# Patient Record
Sex: MALE | Race: WHITE
[De-identification: names, ages, dates, MRNs, and addresses within clinical notes are randomized per-mention and may not be internally consistent; named-entity substitution may affect disease eponyms.]

---

## 2017-06-03 NOTE — EDPHY
H & P


HPI/ROS: 





CHIEF COMPLAINT:  Abdominal pain





HISTORY OF PRESENT ILLNESS:  Patient is a 66-year-old male with multiple 

medical problems who presents to the emergency department with right mid 

abdominal pain since this morning. Patient has previously had bladder cancer 

with resection.  He has urostomy in place.  He has noticed increased mucosal 

distension at the urostomy site.  He also has pain at that location.  His pain 

is moderate to severe.  He states he also has worsening shortness of breath 

since this morning. No cough.  No fevers or chills. Patient denies chest pain.





REVIEW OF SYSTEMS:  


My complete review of systems is negative except as mentioned in the HPI.


Past Medical/Surgical History: 





Includes bladder cancer, status post resection, urinary tract infection, 

diverticulosis, hypertension, hepatic steatosis, hiatal hernia, atrial 

fibrillation, hydronephrosis





Past surgical history:  Includes nephrostomy, cystoprostatectomy with ileal 

conduit





Social history:  The patient is a [facility] resident


Smoking Status: Current some day smoker


Physical Exam: 





Vitals noted


GENERAL:  mild acute distress, alert.


HEENT:  Eyes normal to inspection, normal pharynx, no signs of dehydration.


NECK:  No thyromegaly, no lymphadenopathy, supple.


RESPIRATORY:  mild increased work of breathing.  No rales, rhonchi or wheezing.


CVS:  Regular rate and rhythm, no rubs, murmurs, or gallops.


ABDOMEN:  Soft, nondistended, no organomegaly.  Patient has of the right 

urostomy bag in place.  This has yellow discharge. There is pink mucosa noted.  

Mild tenderness to palpation surrounding the bag.  No visible cellulitis


BACK:  Normal to inspection, no CVA tenderness.


SKIN:  Normal color, no rash, warm, dry.  No pallor.


EXTREMITIES:  No pedal edema, no calf tenderness, no Homans sign or cords, no 

joint swelling.


NEURO/PSYCH:  Alert and oriented x3, normal mood and affect, normal motor 

sensory exam.  No obvious cranial nerve deficit.


Constitutional: 


 Initial Vital Signs











Temperature (C)  36.6 C   06/03/17 18:13


 


Heart Rate  80   06/03/17 18:13


 


Respiratory Rate  16   06/03/17 18:13


 


Blood Pressure  117/98 H  06/03/17 18:13


 


O2 Sat (%)  94   06/03/17 18:13








 











O2 Delivery Mode               Nasal Cannula


 


O2 (L/minute)                  2














Allergies/Adverse Reactions: 


 





Hemant's Formula 44D Allergy (Uncoded 12/18/16 17:52)


 "throat swells up"








Home Medications: 














 Medication  Instructions  Recorded


 


NK [No Known Home Meds]  06/03/17














Medical Decision Making





- Diagnostics


EKG Interpretation: 





Sinus rhythm at 81. Normal axis.  Normal intervals.  No ST or T-wave 

abnormality.


Imaging Results: 


 Imaging Impressions





Abdomen CT  06/03/17 18:21


Impression:


1. Right hydronephrosis of a patient status post cystectomy and prostatectomy. 

A left ureteral stent is in place.


2. Herniation of loops of small bowel adjacent to the right lower quadrant 

ostomy.


3. Rather extensive diverticulosis without diverticulitis.


4. See above report for additional findings.


 


Results called and discussed with MAYA MICHAEL M.D. on 6/3/2017 at 20:44








Chest X-Ray  06/03/17 18:21


Impression:


1. Borderline cardiac enlargement without pulmonary edema.


2. Basilar opacities probably a combination of atelectasis and scarring.











ED Course/Re-evaluation: 





I met EMS on arrival in took report from the paramedic.  In the emergency 

department I discussed possible etiologies with the patient.  Laboratory 

studies and CT imaging were ordered.  Patient was given fentanyl 50 mcg IV and 

Zofran 4 mg IV.





IV the patient's laboratory studies.  White count was normal. He was mildly 

anemic with hematocrit of 36. His BUN was 28 creatinine was 1.1.





CT of the abdomen pelvis:  Please refer to the dictated report.  The patient 

has a small bowel herniation through his ileostomy site.  The urostomy appears 

to be intact. Please refer the dictated report by Dr. Terrance Bro.





Chest x-ray:  Bilateral basilar atelectasis and scarring.  No focal 

infiltrates.  Please refer the dictated report by Dr. Bro.





I discussed the results with the patient.  I answered all his questions.  

Patient was also noted to have significant white cells in his urine.  I am 

aware he has urostomy but he will be treated with Rocephin while he is being 

evaluated for his hernia.





General surgery, Dr. Kaufman was paged.  Hospital service was paged.





910:  I discussed the case with Dr. Kaufman.  He will evaluate the patient for 

the CT findings and herniation.


Differential Diagnosis: 





My differential includes but is not limited to urostomy malfunction, small-

bowel obstruction, perforation, hernia, abscess, pneumonia, pneumothorax, ACS, 

acute MI





- Data Points


Laboratory Results: 


 Laboratory Results





 06/03/17 18:15 





 06/03/17 18:15 





 











  06/03/17 06/03/17 06/03/17





  19:30 18:15 18:15


 


WBC      





    


 


RBC      





    


 


Hgb      





    


 


Hct      





    


 


MCV      





    


 


MCH      





    


 


MCHC      





    


 


RDW      





    


 


Plt Count      





    


 


MPV      





    


 


Neut % (Auto)      





    


 


Lymph % (Auto)      





    


 


Mono % (Auto)      





    


 


Eos % (Auto)      





    


 


Baso % (Auto)      





    


 


Nucleat RBC Rel Count      





    


 


Absolute Neuts (auto)      





    


 


Absolute Lymphs (auto)      





    


 


Absolute Monos (auto)      





    


 


Absolute Eos (auto)      





    


 


Absolute Basos (auto)      





    


 


Absolute Nucleated RBC      





    


 


Immature Gran %      





    


 


Immature Gran #      





    


 


PT      12.7 SEC SEC





     (12.0-15.0) 


 


INR      0.96 





     (0.83-1.16) 


 


APTT      27.6 SEC SEC





     (23.0-38.0) 


 


Sodium    142 mEq/L mEq/L  





    (134-144)  


 


Potassium    4.7 mEq/L mEq/L  





    (3.5-5.2)  


 


Chloride    117 mEq/L H mEq/L  





    ()  


 


Carbon Dioxide    24 mEq/l mEq/l  





    (22-31)  


 


Anion Gap    1 mEq/L L mEq/L  





    (8-16)  


 


BUN    28 mg/dL H mg/dL  





    (7-23)  


 


Creatinine    1.1 mg/dL mg/dL  





    (0.7-1.3)  


 


Estimated GFR    > 60   





    


 


Glucose    87 mg/dL mg/dL  





    ()  


 


Calcium    9.2 mg/dL mg/dL  





    (8.5-10.4)  


 


Troponin I    < 0.012 ng/mL ng/mL  





    (0-0.034)  


 


NT-Pro-B Natriuret Pep    95 pg/mL pg/mL  





    (0-125)  


 


Urine Color  YELLOW     





    


 


Urine Appearance  MODERATELY TURBID     





    


 


Urine pH  6.0     





   (5.0-7.5)   


 


Ur Specific Gravity  1.011     





   (1.002-1.030)   


 


Urine Protein  1+  H     





   (NEGATIVE)   


 


Urine Ketones  NEGATIVE     





   (NEGATIVE)   


 


Urine Blood  1+  H     





   (NEGATIVE)   


 


Urine Nitrate  POSITIVE  H     





   (NEGATIVE)   


 


Urine Bilirubin  NEGATIVE     





   (NEGATIVE)   


 


Urine Urobilinogen  NEGATIVE EU EU    





   (0.2-1.0)   


 


Ur Leukocyte Esterase  3+  H     





   (NEGATIVE)   


 


Urine RBC  15-25 /hpf H /hpf    





   (0-3)   


 


Urine WBC   /hpf H /hpf    





   (0-3)   


 


Ur Epithelial Cells  NONE SEEN /lpf /lpf    





   (NONE-1+)   


 


Urine Bacteria  TRACE /hpf H /hpf    





   (NONE SEEN)   


 


Urine Glucose  NEGATIVE     





   (NEGATIVE)   














  06/03/17





  18:15


 


WBC  5.93 10^3/uL 10^3/uL





   (3.80-9.50) 


 


RBC  3.95 10^6/uL L 10^6/uL





   (4.40-6.38) 


 


Hgb  11.3 g/dL L g/dL





   (13.7-17.5) 


 


Hct  36.8 % L %





   (40.0-51.0) 


 


MCV  93.2 fL fL





   (81.5-99.8) 


 


MCH  28.6 pg pg





   (27.9-34.1) 


 


MCHC  30.7 g/dL L g/dL





   (32.4-36.7) 


 


RDW  16.1 % H %





   (11.5-15.2) 


 


Plt Count  248 10^3/uL 10^3/uL





   (150-400) 


 


MPV  9.3 fL fL





   (8.7-11.7) 


 


Neut % (Auto)  62.7 % %





   (39.3-74.2) 


 


Lymph % (Auto)  22.6 % %





   (15.0-45.0) 


 


Mono % (Auto)  10.6 % %





   (4.5-13.0) 


 


Eos % (Auto)  3.0 % %





   (0.6-7.6) 


 


Baso % (Auto)  0.8 % %





   (0.3-1.7) 


 


Nucleat RBC Rel Count  0.0 % %





   (0.0-0.2) 


 


Absolute Neuts (auto)  3.71 10^3/uL 10^3/uL





   (1.70-6.50) 


 


Absolute Lymphs (auto)  1.34 10^3/uL 10^3/uL





   (1.00-3.00) 


 


Absolute Monos (auto)  0.63 10^3/uL 10^3/uL





   (0.30-0.80) 


 


Absolute Eos (auto)  0.18 10^3/uL 10^3/uL





   (0.03-0.40) 


 


Absolute Basos (auto)  0.05 10^3/uL 10^3/uL





   (0.02-0.10) 


 


Absolute Nucleated RBC  0.00 10^3/uL 10^3/uL





   (0-0.01) 


 


Immature Gran %  0.3 % %





   (0.0-1.1) 


 


Immature Gran #  0.02 10^3/uL 10^3/uL





   (0.00-0.10) 


 


PT  





  


 


INR  





  


 


APTT  





  


 


Sodium  





  


 


Potassium  





  


 


Chloride  





  


 


Carbon Dioxide  





  


 


Anion Gap  





  


 


BUN  





  


 


Creatinine  





  


 


Estimated GFR  





  


 


Glucose  





  


 


Calcium  





  


 


Troponin I  





  


 


NT-Pro-B Natriuret Pep  





  


 


Urine Color  





  


 


Urine Appearance  





  


 


Urine pH  





  


 


Ur Specific Gravity  





  


 


Urine Protein  





  


 


Urine Ketones  





  


 


Urine Blood  





  


 


Urine Nitrate  





  


 


Urine Bilirubin  





  


 


Urine Urobilinogen  





  


 


Ur Leukocyte Esterase  





  


 


Urine RBC  





  


 


Urine WBC  





  


 


Ur Epithelial Cells  





  


 


Urine Bacteria  





  


 


Urine Glucose  





  














Departure





- Departure


Clinical Impression: 


 Shortness of breath





Abdominal pain


Qualifiers:


 Abdominal location: generalized Qualified Code(s): R10.84 - Generalized 

abdominal pain





Urinary tract infection


Qualifiers:


 Urinary tract infection type: site unspecified Hematuria presence: with 

hematuria Qualified Code(s): N39.0 - Urinary tract infection, site not specified

; R31.9 - Hematuria, unspecified





Condition: Good


Referrals: 


Patient,NotPresent [Unknown] - As per Instructions

## 2017-06-03 NOTE — CPEKG
Heart Rate: 81

RR Interval: 741

P-R Interval: 176

QRSD Interval: 102

QT Interval: 388

QTC Interval: 451

P Axis: 14

QRS Axis: -6

T Wave Axis: 3

EKG Severity - NORMAL ECG -

EKG Impression: SINUS RHYTHM

Electronically Signed By: Letty Grajeda 03-Jun-2017 21:29:03

## 2017-06-03 NOTE — GHP
[f rep st]



                                                       PREOP HISTORY AND PHYSICAL





DATE OF ADMISSION:  06/03/2017



HISTORY OF PRESENT ILLNESS:  A 66-year-old male, who presents with crampy abdominal pain for 2 days.
  He is shown to have a parastomal hernia which is incarcerated and tender.  He has a urine stoma af
ter having a radical cystectomy and appears to have a parastomal incarcerated hernia.  He is admitte
d at this time for surgery.  Risks and options have been fully discussed, and he wishes to proceed. 
 His labs were okay.  Urine shows 100 white cells.



PAST MEDICAL HISTORY:  Includes bladder cancer with a radical resection.  He has a chronic urinary t
ract infection.  History of hypertension, hiatal hernia, atrial fibrillation, and hydronephrosis.  ANTONIO marie has had nephrostomy tubes, cystoprostatectomy with ileal conduit.



REVIEW OF SYSTEMS:  Reveals he is a regular smoker.  Denies any major cardiopulmonary symptoms or an
y other major medical problems on a full complete review of systems.



ALLERGIES:  Vicks formula.



HOME MEDICATIONS:  None.



PHYSICAL EXAMINATION:  GENERAL:  Reveals him to be an alert, reasonably comfortable male, afebrile. 
 HEAD/NECK:  Reveals no icterus or oral lesions.  No adenopathy.  No thyromegaly.  CHEST:  Clear to 
auscultation and percussion.  CARDIAC:  Reveals a regular rhythm with no murmurs.  ABDOMEN:  Soft, s
lightly protuberant.  He has a urinary stoma in the right lower quadrant with adjacent tenderness an
d fullness, consistent with an incarcerated peristomal hernia.  He does have bowel sounds.  EXTREMIT
IES:  Benign with full pulses.  No significant edema.  NEUROLOGIC:  Physiologic.  SKIN:  No obvious 
lesions.



IMPRESSION:  Incarcerated peristomal hernia.



PLAN:  Urgent surgery.  Risks and options have been fully discussed, and he wishes to proceed.





Job #:  [id number]

## 2017-06-04 NOTE — CPEKG
Heart Rate: 86

RR Interval: 698

P-R Interval: 172

QRSD Interval: 102

QT Interval: 376

QTC Interval: 450

P Axis: 14

QRS Axis: -8

T Wave Axis: 0

EKG Severity - NORMAL ECG -

EKG Impression: SINUS RHYTHM

Electronically Signed By: Andrae White 04-Jun-2017 11:05:41

## 2017-06-04 NOTE — POSTOPPROG
Post Op Note


Date of Operation: 06/04/17


Surgeon: Dilip Kaufman


Anesthesiologist: ALEJANDRO RAPHAEL


Anesthesia: GET(General Endotracheal)


Pre-op Diagnosis: INCARCERATED PARASTOMAL HERNIA 


Post-op Diagnosis: SAME


Indication: PAIN


Procedure: OPEN REPAIR INCARCERATED PARASTOMAL HERNIA


Findings: VIABLE BOWELL/ 5 CM DEFECT


Inf/Abcess present in the surg proc area at time of surgery?: No


Depth: Organ Space


EBL: Minimal


Complications: 





0


Specimen(s): 





NONE

## 2017-06-04 NOTE — SOAPPROG
SOAP Progress Note


Assessment/Plan: 


Assessment:  Hydronephrosis, left   Acute will remove stent . Right side 

hydronephrosis noted and normal creatinine. Consider Nuclear renogram with 

lasix to assess obstruction or an ileal conduit loopogram to assess reflux for 

rt hydro





Plan: as noted





06/04/17 11:14





Subjective: 


doing well, is well informed





Objective: 





 Vital Signs











Temp Pulse Resp BP Pulse Ox


 


 36.6 C   88   16   136/92 H  93 


 


 06/04/17 08:50  06/04/17 08:50  06/04/17 08:50  06/04/17 08:50  06/04/17 08:50








 Laboratory Results





 06/04/17 05:06 





 06/04/17 05:06 





 











 06/03/17 06/04/17 06/05/17





 05:59 05:59 05:59


 


Intake Total  1060 


 


Output Total  680 


 


Balance  380 








 











PT  12.7 SEC (12.0-15.0)   06/03/17  18:15    


 


INR  0.96  (0.83-1.16)   06/03/17  18:15    














Physical Exam





- Physical Exam


General Appearance: alert


Neck: supple


Respiratory: No respiratory distress


Cardiac/Chest: regular rate, rhythm


Abdomen: soft (stoma and stent removed in tact)


Back: No CVA tenderness


Extremities: non-tender, No calf tenderness, No Shawanda's sign


Neuro/Psych: oriented x 3





ICD10 Worksheet


Patient Problems: 


 Problems











Problem Status Onset


 


Abdominal pain Acute  


 


Shortness of breath Acute  


 


Urinary tract infection Acute  


 


Bladder cancer Acute  


 


Edema Acute  


 


Gross hematuria Acute  


 


Hydronephrosis, left Acute

## 2017-06-04 NOTE — GHP
[f 
rep st]



                                                            HISTORY AND PHYSICAL





DATE OF ADMISSION:  06/03/2017



CHIEF COMPLAINT:  Abdominal pain.



HISTORY OF PRESENT ILLNESS:  The patient is a 66-year-old male with a history 
of bladder cancer, status post resection with an ileal conduit, who presents to 
the emergency department with right upper quadrant and right flank pain.  His 
symptoms started earlier this morning.  He notes increasing distention of his 
abdomen.  No nausea or vomiting.  He denies fevers or chills.  He has had 
normal urine output through his ileal conduit, though some debris has been 
noted.  He has no chest pain.  He does feel a bit short of breath as this 
increases his abdominal discomfort.  No changes in his bowel function. 



In the emergency department, patient underwent abdominal CT scan which showed 
right-sided hydronephrosis as well as herniation of loops of small bowel into 
the right lower quadrant ostomy.  Surgical consult is obtained and he is 
admitted to the hospital for further management.



PAST MEDICAL HISTORY:  

1.  Hypertension.

2.  Atrial fibrillation.

3.  History of bladder cancer.

4.  Diverticulosis.



SURGICAL HISTORY:  

1.  Cystectomy and prostatectomy with bilateral lymph node dissection and ileal 
conduit in September 2016, by Dr. Jewel Martines.

2.  Inguinal hernia repair.

3.  TURBT.



MEDICATIONS:  Please see [facility] for complete updated outpatient medication 
list.



ALLERGIES:  No known drug allergies.



FAMILY HISTORY:  Reviewed and not pertinent.



SOCIAL HISTORY:  The patient lives at [facility].  He is a former smoker.  
He denies alcohol use.  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 
has not used any drugs for at least 2 years.



REVIEW OF SYSTEMS:  A 10-point Review of Systems was performed and was negative 
except as per HPI.



OBJECTIVE:  VITAL SIGNS:  Temperature is 36.6, blood pressure 117/98, heart 
rate 80, respiratory rate 16, he is 94% on room air.  GENERAL:  The patient is 
awake, alert, oriented, in no acute distress.  HEENT:  Head is atraumatic, 
normocephalic.  Pupils equal, round, and reactive to light.  Extraocular 
motions are intact.  Oropharynx is clear.  Mucous membranes are moist.  He has 
poor dentition.  NECK:  Supple.  There is no JVD.  HEART:  Regular rate and 
rhythm without murmur.  LUNGS:  Reveal decreased breath sounds with mild 
atelectatic crackles at the bases.  Otherwise, clear to auscultation.  ABDOMEN:
  Soft, mildly distended.  His ileal conduit reveals clear urine in the bag.  
There is periostomy distention with tenderness to palpation.  There is no 
rebound, rigidity, guarding, or peritoneal signs.  Normoactive bowel tones are 
detected.  EXTREMITIES: Without cyanosis, clubbing, or edema.  NEUROLOGIC:  
Grossly nonfocal.



LABORATORY DATA:  CBC reveals normal white blood cell count, hemoglobin of 11.3
, platelets are normal.  INR is 0.96.  Basic metabolic panel is remarkable for 
chloride of 117, BUN 28, creatinine 1.1.  LFTs are pending.  Troponin is 
negative.  Urinalysis shows 1+ blood, positive nitrites,  white cells.  
This was drawn from his ostomy bag. 



Abdomen and pelvis CT, Elli 3, 2017, shows moderate right-sided hydronephrosis, 
status post cystectomy and prostatectomy.  A left ureteral stent is in place.  
There is herniation of loops of small bowel adjacent to the right lower 
quadrant ostomy along with extensive diverticulosis without evidence of 
diverticulitis. 



Chest x-ray, personally reviewed and interpreted, there is borderline 
cardiomegaly, no kennedy pulmonary edema or obvious infiltrates.  Bibasilar 
opacities likely represent atelectasis.  Decreased lung volumes.



ASSESSMENT AND PLAN:  The patient is a 66-year-old male with a history of 
hypertension, atrial fibrillation, and prior bladder cancer, status post 
resection with ileal conduit, who presents to the emergency department with 
abdominal pain.  He is admitted to the hospital for further evaluation.



1.  Right lower quadrant abdominal hernia associated with an ostomy.  There is 
no evidence of incarceration by CT scan and he is afebrile.  This likely needs 
to be repaired.  The case was reviewed with Dr. Michael Kaufman, general surgery, 
who will consult.  He will be admitted for pain control and likely surgical 
intervention.  Will make him n.p.o. at midnight.  We will closely monitor his 
vital signs, and discuss with Surgery should he develop any fevers or acute 
status changes.



2.  Right-sided hydronephrosis.  This is new.  His right ureter drains into the 
ileal conduit.  Thus, the hernia may be contributing to an obstructive process.
  As above, he is afebrile with normal wbc's.  He does have an abnormal 
urinalysis, though this was drawn from his ostomy bag, so I would expect this 
to look abnormal.  He received a dose of IV ceftriaxone in the ED.  Will defer 
further antibiotics for now, though should he have fevers or change in clinical 
status, would resume antibiotics.  Urology has been consulted and will see the 
patient in the morning.  His hydro may resolve once he has his hernia repaired.



3.  Hypertension.  The patient is normotensive on arrival.  His medication 
reconciliation is pending.



4.  History of atrial fibrillation.  I have ordered an EKG to confirm his 
rhythm which sounded regular by auscultation on exam.  As above, I am awaiting 
medication reconciliation, though it does not appear he is on any rate control 
agents or anticoagulation, which we can revisit with him postoperatively.



5.  Deep venous thrombosis prophylaxis.  We will place SCDs for now.  Will 
defer Lovenox in the event he goes to the operating room tomorrow.  We can 
start this at an appropriate time postoperatively.



6.  Code status.  Patient is full code.



7.  Disposition.  Patient is admitted as inpatient status, as I expect he will 
likely require greater than 48 hours hospitalization for ongoing management of 
his abdominal hernia and hydronephrosis.





Job #:  [id number]

MTDD

## 2017-06-04 NOTE — SOAPPROG
SOAP Progress Note


Assessment/Plan: 


Assessment:


wound ok/ uo ok/ afebrile/ abd soft/ vs stable























Plan:advance diet





06/04/17 10:22





Objective: 





 Vital Signs











Temp Pulse Resp BP Pulse Ox


 


 36.6 C   88   16   136/92 H  93 


 


 06/04/17 08:50  06/04/17 08:50  06/04/17 08:50  06/04/17 08:50  06/04/17 08:50








 Laboratory Results





 06/04/17 05:06 





 06/04/17 05:06 





 











 06/03/17 06/04/17 06/05/17





 05:59 05:59 05:59


 


Intake Total  1060 


 


Output Total  680 


 


Balance  380 








 











PT  12.7 SEC (12.0-15.0)   06/03/17  18:15    


 


INR  0.96  (0.83-1.16)   06/03/17  18:15    














ICD10 Worksheet


Patient Problems: 


 Problems











Problem Status Onset


 


Abdominal pain Acute  


 


Shortness of breath Acute  


 


Urinary tract infection Acute  


 


Bladder cancer Acute  


 


Edema Acute  


 


Gross hematuria Acute  


 


Hydronephrosis, left Acute

## 2017-06-04 NOTE — HOSPPROG
Hospitalist Progress Note


Assessment/Plan: 





66M resident at [facility], [facility] bladder CA s/p resection and ileal conduit, 

htn, PAF, admitted with RUQ pain and R flank pain starting 6/3, day of admit. 

Noted increasing abdominal distention with normal UOP through ileal conduit. CT 

abdomen showed R sided hydronephrosis as well as herniation of loops of small 

bowel int RLQ ostomy. Pt new to me. Chart reviewed. ECG personally interpreted 

shows SR.





#. RLQ incarcerated parastomal hernia: POD #1 for open repair with Dr. Kaufman





#. R-sided hydronephrosis: may be related to hernia 


   urology has been consulted





#. htn: not on any outpatient meds for this per med rec


   appears acceptable now/ will monitor





#. PAF: currently in SR by 12 lead ECG


   [id number] appears to be 2


   will start ASA when deemed safe from post-op perspective





#. DVT ppx: in SCDs


   will need SC Enox when deemed appropriate by gen surg





#. Los: unclear at this point


   Await urology consult


   Advance diet as per gen surg


Subjective: Reports abdominal pain present. Getting kidney U/S presently.


Objective: 


 Vital Signs











Temp Pulse Resp BP Pulse Ox


 


 97.9 F   88   17   144/86 H  93 


 


 06/04/17 06:30  06/04/17 06:30  06/04/17 06:30  06/04/17 06:30  06/04/17 06:30








 Laboratory Results





 06/04/17 05:06 





 06/04/17 05:06 





 











 06/03/17 06/04/17 06/05/17





 05:59 05:59 05:59


 


Intake Total  1060 


 


Output Total  680 


 


Balance  380 








 











PT  12.7 SEC (12.0-15.0)   06/03/17  18:15    


 


INR  0.96  (0.83-1.16)   06/03/17  18:15    














- Physical Exam


Constitutional: no apparent distress, appears nourished


Ears, Nose, Mouth, Throat: poor dentition


Cardiovascular: regular rate and rhythym, no murmur, rub, or gallop


Respiratory: no respiratory distress


Gastrointestinal: distension, other (+BS )


Genitourinary: other (ostomy bag with urine that is cloudy but without blood)


Skin: warm, normal color


Neurologic: AAOx3


Psychiatric: interacting appropriately





ICD10 Worksheet


Patient Problems: 


 Problems











Problem Status Onset


 


Edema Acute  


 


Bladder cancer Acute  


 


Gross hematuria Acute  


 


Hydronephrosis, left Acute  


 


Abdominal pain Acute  


 


Shortness of breath Acute  


 


Urinary tract infection Acute

## 2017-06-05 NOTE — HOSPPROG
Hospitalist Progress Note


Assessment/Plan: 





66M resident at [facility], [facility] bladder CA s/p resection and ileal conduit, 

htn, PAF, admitted with RUQ pain and R flank pain starting 6/3, day of admit. 

Noted increasing abdominal distention with normal UOP through ileal conduit. CT 

abdomen showed R sided hydronephrosis as well as herniation of loops of small 

bowel int RLQ ostomy. Pt new to me. Chart reviewed. ECG personally interpreted 

shows SR.





#. RLQ incarcerated parastomal hernia: POD #2 for open repair with Dr. Kaufman





#. R-sided hydronephrosis: may be related to hernia 


   urology consult was reviewed and appreciated


   await mag 3 renal scan





#. elevated blood pressure without history of htn (resolved)


   appears acceptable now/ will monitor





#. PAF: currently in SR by 12 lead ECG


   [id number] appears to be 2


   Start ASA 





#. DVT ppx: in SCDs


   -Dr Kaufman is agreeable to start LMWH as well as ASA





#. Los: unclear at this point


   Advance diet as per gen surg





Subjective: contineus to have severe pain over incision site.  no other acute 

complaints


Objective: 


 Vital Signs











Temp Pulse Resp BP Pulse Ox


 


 36.4 C   70   18   114/68   95 


 


 06/05/17 07:35  06/05/17 07:35  06/05/17 07:35  06/05/17 07:35  06/05/17 07:35








 Laboratory Results





 06/04/17 05:06 





 06/04/17 05:06 





 











 06/04/17 06/05/17 06/06/17





 05:59 05:59 05:59


 


Intake Total 1060 1800 750


 


Output Total 680 1000 1020


 


Balance 380 800 -270








 











PT  12.7 SEC (12.0-15.0)   06/03/17  18:15    


 


INR  0.96  (0.83-1.16)   06/03/17  18:15    














- Physical Exam


Constitutional: no apparent distress, appears nourished, not in pain


Cardiovascular: regular rate and rhythym, no murmur, rub, or gallop


Respiratory: no respiratory distress, no rales or rhonchi, clear to auscultation





ICD10 Worksheet


Patient Problems: 


 Problems











Problem Status Onset


 


Edema Acute  


 


Bladder cancer Acute  


 


Gross hematuria Acute  


 


Hydronephrosis, left Acute  


 


Abdominal pain Acute  


 


Shortness of breath Acute  


 


Urinary tract infection Acute

## 2017-06-05 NOTE — SOAPPROG
SOAP Progress Note


Assessment/Plan: 


Assessment:


wound ok/ uo ok/ afebrile/ abd soft/ vs stable























Plan:advance diet





06/04/17 10:22





06/05/17 22:33


SEEN EARLIER THIS AM/ WOUND OK/ AFEBRILE/ CO BLOATING/ ABD SOFT, MILDLY 

DISTENDED WITH BS/ STOMA PINK


PROBABLE CONSTIPATION/  CHECK 2-WAY/ CATHARSIS


Objective: 





 Vital Signs











Temp Pulse Resp BP Pulse Ox


 


 37.1 C   81   18   128/95 H  95 


 


 06/05/17 20:00  06/05/17 20:00  06/05/17 20:00  06/05/17 20:00  06/05/17 20:00








 Laboratory Results





 06/04/17 05:06 





 06/04/17 05:06 





 











 06/04/17 06/05/17 06/06/17





 05:59 05:59 05:59


 


Intake Total 1060 1800 1500


 


Output Total 680 1000 2420


 


Balance 380 800 -920








 











PT  12.7 SEC (12.0-15.0)   06/03/17  18:15    


 


INR  0.96  (0.83-1.16)   06/03/17  18:15    














ICD10 Worksheet


Patient Problems: 


 Problems











Problem Status Onset


 


Abdominal pain Acute  


 


Shortness of breath Acute  


 


Urinary tract infection Acute  


 


Bladder cancer Acute  


 


Edema Acute  


 


Gross hematuria Acute  


 


Hydronephrosis, left Acute

## 2017-06-06 NOTE — HOSPPROG
Hospitalist Progress Note


Assessment/Plan: 





#Abd pain: post-op. Cont PRN opioids





#Incarcerated ileal conduit parastomal hernia


-s/p repair. Cont OOB





#MRSA/Aerococcus in urine: suspect colonization. Afebrile without leukocytosis. 

Repeat UA





#Right-sided hydronephritis: discuss Mag-3 scan with Urology





#h/o bladder cancer: s/p cystoprostatectomy





#PAF: not on rate-controlling meds. Started ASA 81mg





#Deconditioning: walker at baseline. Cont PT/OT





#DVT ppx: Lovenox





#Disp: cont inpt admission with pain control, PT, repeat UA


Subjective: upset this morning bc staff would not change out ostomy bag


Objective: 


 Vital Signs











Temp Pulse Resp BP Pulse Ox


 


 36.8 C   78   16   98/68 L  96 


 


 06/06/17 09:37  06/06/17 09:37  06/06/17 09:37  06/06/17 09:37  06/06/17 09:37








 Laboratory Results





 06/06/17 05:15 





 06/06/17 05:15 





 











 06/05/17 06/06/17 06/07/17





 05:59 05:59 05:59


 


Intake Total 1800 1980 


 


Output Total 1000 3490 


 


Balance 800 -1510 








 











PT  12.7 SEC (12.0-15.0)   06/03/17  18:15    


 


INR  0.96  (0.83-1.16)   06/03/17  18:15    














- Physical Exam


Constitutional: chronically ill appearing, uncomfortable


Eyes: PERRL


Ears, Nose, Mouth, Throat: moist mucous membranes, hearing normal


Cardiovascular: regular rate and rhythym


Respiratory: no respiratory distress


Gastrointestinal: normoactive bowel sounds, soft, non-tender abdomen


Genitourinary: other (surgical incisiopn C/D/I. Urostomy with clear yellow urine

)


Skin: warm


Musculoskeletal: full muscle strength


Neurologic: AAOx3


Psychiatric: interacting appropriately





ICD10 Worksheet


Patient Problems: 


 Problems











Problem Status Onset


 


Edema Acute  


 


Bladder cancer Acute  


 


Gross hematuria Acute  


 


Hydronephrosis, left Acute  


 


Abdominal pain Acute  


 


Shortness of breath Acute  


 


Urinary tract infection Acute

## 2017-06-06 NOTE — SOAPPROG
SOAP Progress Note


Assessment/Plan: 


Assessment/Plan: 66 Y M s/p repair of incarcerated ileal conduit parastomal 

hernia. 





Continue OOB, cathartics, routine post op care. 





S: c/o incisional pain. +BM this am. Doesn't want to be sitting up in chair 

anymore. Says he hasn't walked much. 


O: 


alert, nad


mmm, no jaundice


no wob


abd softly distended +BS


Wound c/d/i, no erythema. Light clear yellow urine in bag. 





06/06/17 13:44





Objective: 





 Vital Signs











Temp Pulse Resp BP Pulse Ox


 


 36.8 C   78   16   98/68 L  96 


 


 06/06/17 09:37  06/06/17 09:37  06/06/17 09:37  06/06/17 09:37  06/06/17 09:37








 Laboratory Results





 06/06/17 05:15 





 06/06/17 05:15 





 











 06/05/17 06/06/17 06/07/17





 05:59 05:59 05:59


 


Intake Total 1800 1980 


 


Output Total 1000 3490 


 


Balance 800 -1510 








 











PT  12.7 SEC (12.0-15.0)   06/03/17  18:15    


 


INR  0.96  (0.83-1.16)   06/03/17  18:15    














ICD10 Worksheet


Patient Problems: 


 Problems











Problem Status Onset


 


Abdominal pain Acute  


 


Shortness of breath Acute  


 


Urinary tract infection Acute  


 


Bladder cancer Acute  


 


Edema Acute  


 


Gross hematuria Acute  


 


Hydronephrosis, left Acute

## 2017-06-07 NOTE — HOSPPROG
Hospitalist Progress Note


Assessment/Plan: 





#Abd pain: post-op. Cont PRN opioids





#Incarcerated ileal conduit parastomal hernia


-s/p repair. Cont OOB





#MRSA/Aerococcus in urine: suspect colonization. Afebrile without leukocytosis. 

No signs of bacteremia. No treatment warranted





#Right-sided hydronephritis: call out to Urology to discuss renogram





#h/o bladder cancer: s/p cystoprostatectomy





#PAF: not on rate-controlling meds. Started ASA 81mg





#Deconditioning: walker at baseline. Emphasized that he needs to be out of bed 

walking the unit





#DVT ppx: Lovenox





#Disp: cont inpt admission with pain control, PT, repeat UA


Subjective: less pain today. Walked the unit once


Objective: 


 Vital Signs











Temp Pulse Resp BP Pulse Ox


 


 36.8 C   85   20   91/83 H  91 L


 


 06/07/17 08:15  06/07/17 08:15  06/07/17 08:15  06/07/17 08:15  06/07/17 08:15








 Microbiology











 06/04/17 Unknown Urine Culture - Final





 Urine,Clean Catch    MRSA





    Aerococcus Urinae





    Two [place] Types








 Laboratory Results





 06/07/17 05:30 





 06/07/17 05:30 





 











 06/06/17 06/07/17 06/08/17





 05:59 05:59 05:59


 


Intake Total 1980 2130 


 


Output Total 3490 2070 300


 


Balance -1510 60 -300








 











PT  12.7 SEC (12.0-15.0)   06/03/17  18:15    


 


INR  0.96  (0.83-1.16)   06/03/17  18:15    














- Physical Exam


Eyes: PERRL


Ears, Nose, Mouth, Throat: moist mucous membranes


Cardiovascular: regular rate and rhythym


Respiratory: no respiratory distress


Gastrointestinal: normoactive bowel sounds, other (abd surgical incision, C/D/I)


Genitourinary: other (osotomy with pink tissue)


Skin: warm


Musculoskeletal: full muscle strength


Neurologic: CN II-XII Intact


Psychiatric: flat affect





ICD10 Worksheet


Patient Problems: 


 Problems











Problem Status Onset


 


Abdominal pain Acute  


 


MRSA (methicillin resistant Staphylococcus aureus) Acute  ~06/04/17


 


Shortness of breath Acute  


 


Urinary tract infection Acute  


 


Bladder cancer Acute  


 


Edema Acute  


 


Gross hematuria Acute  


 


Hydronephrosis, left Acute

## 2017-06-07 NOTE — SOAPPROG
SOAP Progress Note


Assessment/Plan: 


Assessment/Plan: 66 Y M s/p repair of incarcerated ileal conduit parastomal 

hernia. 





Still with local pain complaints. Wound is healing well without signs of 

infection or recurrent hernia. Pain seems to be normal expected postoperative 

pain. I explained that I'd like to get in under control well enough that he can 

be mobile. He says he walked in the hallway yesterday. I think he is healing 

well. 





Urology may need to interpret renal study. 





Will continue to follow, but will also put orders in d/c plan in case d/c is 

impending--defer to medicine. 





S: c/o incisional pain. +BM last evening.


O: 


alert, nad


mmm, no jaundice


no wob


abd softly distended +BS


Wound c/d/i, no erythema. Light clear yellow urine in bag. 





06/07/17 12:12





Objective: 





 Vital Signs











Temp Pulse Resp BP Pulse Ox


 


 36.8 C   85   20   91/83 H  91 L


 


 06/07/17 08:15  06/07/17 08:15  06/07/17 08:15  06/07/17 08:15  06/07/17 08:15








 Microbiology











 06/04/17 Unknown Urine Culture - Final





 Urine,Clean Catch    MRSA





    Aerococcus Urinae





    Two [place] Types








 Laboratory Results





 06/07/17 05:30 





 06/07/17 05:30 





 











 06/06/17 06/07/17 06/08/17





 05:59 05:59 05:59


 


Intake Total 1980 2130 


 


Output Total 3490 2070 500


 


Balance -1510 60 -500








 











PT  12.7 SEC (12.0-15.0)   06/03/17  18:15    


 


INR  0.96  (0.83-1.16)   06/03/17  18:15    














ICD10 Worksheet


Patient Problems: 


 Problems











Problem Status Onset


 


Abdominal pain Acute  


 


MRSA (methicillin resistant Staphylococcus aureus) Acute  ~06/04/17


 


Shortness of breath Acute  


 


Urinary tract infection Acute  


 


Bladder cancer Acute  


 


Edema Acute  


 


Gross hematuria Acute  


 


Hydronephrosis, left Acute

## 2017-06-08 NOTE — SOAPPROG
SOAP Progress Note


Assessment/Plan: 


Assessment:





65yo male s/p repair of incarcerated ileal conduit parastomal hernia





tolerating diet, still having pain immediately around incision right abdomen, 

ambulated in [place] last night





PE


awake, comfortable


abdomen incision clean/dry, conduit in place, abdomen soft nontender to 

palpation








Plan:


ok to d/c from surgical perspective, as an aside lives at [facility]


saw pt with Dr Kaufman


06/08/17 09:11





Objective: 





 Vital Signs











Temp Pulse Resp BP Pulse Ox


 


 36.7 C   79   18   120/68   93 


 


 06/08/17 08:28  06/08/17 08:28  06/08/17 08:28  06/08/17 08:28  06/08/17 08:28








 Microbiology











 06/04/17 Unknown Urine Culture - Final





 Urine,Clean Catch    MRSA





    Aerococcus Urinae





    Two [place] Types








 Laboratory Results





 06/07/17 05:30 





 06/08/17 05:35 





 











 06/07/17 06/08/17 06/09/17





 05:59 05:59 05:59


 


Intake Total 2130 2000 


 


Output Total 2070 1450 200


 


Balance 60 550 -200








 











PT  12.7 SEC (12.0-15.0)   06/03/17  18:15    


 


INR  0.96  (0.83-1.16)   06/03/17  18:15    














ICD10 Worksheet


Patient Problems: 


 Problems











Problem Status Onset


 


Abdominal pain Acute  


 


MRSA (methicillin resistant Staphylococcus aureus) Acute  ~06/04/17


 


Shortness of breath Acute  


 


Urinary tract infection Acute  


 


Bladder cancer Acute  


 


Edema Acute  


 


Gross hematuria Acute  


 


Hydronephrosis, left Acute

## 2017-06-08 NOTE — GDS
[f 
rep st]



                                                             DISCHARGE SUMMARY





DISCHARGE DIAGNOSES:  

1.  Parastomal hernia, status post repair.

2.  Right hydronephrosis

3.  Methicillin-resistant Staphylococcus aureus/Aerococcus in urine, suspected 
colonization.

4.  History of bladder cancer status post cystoprostatectomy.

5..  Paroxysmal atrial fibrillation.

6.  Deconditioning.



HISTORY OF PRESENT ILLNESS:  Patient is a 66-year-old male, with a history of 
bladder cancer status post resection with ileal conduit, presenting to the 
emergency room with right upper quadrant and right flank pain that started on 
the date of admission. He noticed increased abdominal distention and pain. 
Denies nausea or vomiting, fevers or chills. He has normal urine output through 
his ileal conduit, though some debris has been noted. He denies chest pain. He 
does feel a little bit of shortness of breath with his abdominal discomfort. No 
change in bowel function. 



In ER had a CT done which showed right-sided hydronephrosis and herniation of 
loops of small bowel to the right quadrant ostomy. Surgery was consulted.



HOSPITAL COURSE BY PROBLEM:  

1.  Parastomal hernia: Patient underwent repair by surgery, he has been doing 
well postoperative. The surgical incision is healing well. Encouraged 
ambulation. P.r.n. oxycodone for pain, bowel regimen.

2.  Right hydronephrosis: Urology was consulted. He does have a left ureter 
stent, that they plan to take out at some point. Renogram was completed for 
them to review. We will have the patient follow up with Dr. Martines as an 
outpatient.

3.  History of bladder cancer status post cysto prostatectomy.

4.  Paroxysmal atrial fibrillation, currently rate controlled. Started on 81 mg 
of aspirin.

5.  Deconditioning. He uses a walker at baseline. Encouraged that he needs to 
be walking in the unit more frequently.



DISPOSITION:  Stable for discharge.



MEDICATIONS:  See medication reconciliation.



FOLLOWUP:  Dr. Martines with Urology.





Job #:  [id number]

MTDD

## 2017-06-08 NOTE — SOAPPROG
SOAP Progress Note


Assessment/Plan: 


Assessment:











Right hydronephrosis














Plan:


Plan on outpatient ileal conduit loopogram to assess reflux for rt hydro and f/

u in office. 


06/08/17 14:51





Subjective: 





Right kidney mildly hurts since ileal conduit placement 


Objective: 





 Vital Signs











Temp Pulse Resp BP Pulse Ox


 


 36.7 C   79   18   120/68   93 


 


 06/08/17 08:28  06/08/17 08:28  06/08/17 08:28  06/08/17 08:28  06/08/17 08:28








 Laboratory Results





 06/07/17 05:30 





 06/08/17 05:35 





 











 06/07/17 06/08/17 06/09/17





 05:59 05:59 05:59


 


Intake Total 2130 2000 


 


Output Total 2070 1450 600


 


Balance 60 550 -600








 











PT  12.7 SEC (12.0-15.0)   06/03/17  18:15    


 


INR  0.96  (0.83-1.16)   06/03/17  18:15    














Physical Exam





- Physical Exam


General Appearance: alert, no apparent distress


Respiratory: normal breath sounds, No respiratory distress


Abdomen: other (mild right CVA TTP)





ICD10 Worksheet


Patient Problems: 


 Problems











Problem Status Onset


 


Abdominal pain Acute  


 


MRSA (methicillin resistant Staphylococcus aureus) Acute  ~06/04/17


 


Shortness of breath Acute  


 


Urinary tract infection Acute  


 


Bladder cancer Acute  


 


Edema Acute  


 


Gross hematuria Acute  


 


Hydronephrosis, left Acute

## 2017-06-08 NOTE — PDIAF
- Diagnosis


Diagnosis: parastomal hernia, s/p repair


Code Status: Full Code





- Medication Management


Discharge Medications: 


 Medications to Continue on Transfer





Acetaminophen [Tylenol 325mg (*)] 650 mg PO Q6 PRN 06/03/17 [Last Taken Unknown]


Hydrocodone/Acetaminophen [Norco 5/325 (*)] 1 - 2 tab PO Q4H PRN 06/03/17 [Last 

Taken Unknown]


Loperamide HCl [Loperamide] 2 mg PO DAILY PRN 06/03/17 [Last Taken Unknown]


Doxycycline Calcium [Vibramycin Oral Susp] 50 mg PO DAILY #1 ml 06/07/17 [Last 

Taken Unknown]








Discharge Medications: Refer to the Discharge Home Medication list for PRN 

reason.





- Orders


Services needed: Registered Nurse, Certified Nursing Aide, Master 

, Physical Therapy


Diet Recommendation: no restrictions on diet


Diet Texture: Regular Texture Diet





- Follow Up Care


Current Providers and Referrals: 


Patient,NotPresent [Unknown] - As per Instructions


Jewel Martines MD [Medical Doctor] - follow up in 2 weeks

## 2017-06-08 NOTE — PDIAF
- Diagnosis


Diagnosis: parastomal hernia, s/p repair


Code Status: Full Code





- Medication Management


Discharge Medications: 


 Medications to Continue on Transfer





Acetaminophen [Tylenol 325mg (*)] 650 mg PO Q6 PRN 06/03/17 [Last Taken Unknown]


Loperamide HCl [Loperamide] 2 mg PO DAILY PRN 06/03/17 [Last Taken Unknown]


Aspirin EC [Aspirin EC 81 mg (*)] 81 mg PO DAILY  tab 06/08/17 [Last Taken 

Unknown]


Sennosides/Docusate Sodium [Senokot-S] 1 - 2 tab PO BID  tab 06/08/17 [Last 

Taken Unknown]


oxyCODONE IR [Oxycodone Ir (*)] 5 mg PO Q6H PRN #30 tab 06/08/17 [Last Taken 

Unknown]








Discharge Medications: Refer to the Discharge Home Medication list for PRN 

reason.





- Orders


Services needed: Registered Nurse (and psych services), Certified Nursing Aide, 

Master , Physical Therapy


Diet Recommendation: no restrictions on diet


Diet Texture: Regular Texture Diet





- Follow Up Care


Current Providers and Referrals: 


Jewel Martines MD [Medical Doctor] - follow up in 2 weeks

## 2017-06-08 NOTE — HOSPPROG
Hospitalist Progress Note


Assessment/Plan: 





#Abd pain: post-op. Cont PRN opioids





#Incarcerated ileal conduit parastomal hernia


-s/p repair. Healing well. Cleared from surgery for DC





#MRSA/Aerococcus in urine: suspect colonization. Afebrile without leukocytosis. 

No signs of bacteremia. No treatment warranted





#Right-sided hydronephritis: call out to Urology to discuss renogram. FU with 

them outpatient





#h/o bladder cancer: s/p cystoprostatectomy





#PAF: not on rate-controlling meds. Started ASA 81mg





#Deconditioning: walker at baseline. Emphasized that he needs to be out of bed 

walking the unit





#DVT ppx: Lovenox





#Disp: DC today


Subjective: walking the unit today with walker


Objective: 


 Vital Signs











Temp Pulse Resp BP Pulse Ox


 


 36.7 C   79   18   120/68   93 


 


 06/08/17 08:28  06/08/17 08:28  06/08/17 08:28  06/08/17 08:28  06/08/17 08:28








 Microbiology











 06/04/17 Unknown Urine Culture - Final





 Urine,Clean Catch    MRSA





    Aerococcus Urinae





    Two [place] Types








 Laboratory Results





 06/07/17 05:30 





 06/08/17 05:35 





 











 06/07/17 06/08/17 06/09/17





 05:59 05:59 05:59


 


Intake Total 2130 2000 


 


Output Total 2070 1450 450


 


Balance 60 550 -450








 











PT  12.7 SEC (12.0-15.0)   06/03/17  18:15    


 


INR  0.96  (0.83-1.16)   06/03/17  18:15    














- Physical Exam


Constitutional: no apparent distress


Eyes: PERRL


Ears, Nose, Mouth, Throat: moist mucous membranes


Cardiovascular: regular rate and rhythym


Respiratory: no respiratory distress, no rales or rhonchi


Gastrointestinal: normoactive bowel sounds, other (surgical incision site 

healing well. mildly TTP)


Genitourinary: no bladder fullness, other (ostomy with pink tissue, clear 

yellow urine)


Skin: warm


Musculoskeletal: full muscle strength


Neurologic: AAOx3, CN II-XII Intact


Psychiatric: interacting appropriately, flat affect





ICD10 Worksheet


Patient Problems: 


 Problems











Problem Status Onset


 


Abdominal pain Acute  


 


MRSA (methicillin resistant Staphylococcus aureus) Acute  ~06/04/17


 


Shortness of breath Acute  


 


Urinary tract infection Acute  


 


Bladder cancer Acute  


 


Edema Acute  


 


Gross hematuria Acute  


 


Hydronephrosis, left Acute

## 2017-06-08 NOTE — SOAPPROG
SOAP Progress Note


Assessment/Plan: 


Assessment:


wound ok/ uo ok/ afebrile/ abd soft/ vs stable























Plan:advance diet





06/04/17 10:22





06/05/17 22:33


SEEN EARLIER THIS AM/ WOUND OK/ AFEBRILE/ CO BLOATING/ ABD SOFT, MILDLY 

DISTENDED WITH BS/ STOMA PINK


PROBABLE CONSTIPATION/  CHECK 2-WAY/ CATHARSIS


06/08/17 18:54





WOUND OKAY/OSTOMY OKAY/ AFEBRILE/ URINE OUTPUT GREAT /      VERY SLOW TO 

MOBILIZE / HOPEFULLY HOME THIS WEEKEND


Objective: 





 Vital Signs











Temp Pulse Resp BP Pulse Ox


 


 37 C   87   16   109/88 H  92 


 


 06/08/17 17:10  06/08/17 17:10  06/08/17 17:10  06/08/17 17:10  06/08/17 17:10








 Laboratory Results





 06/07/17 05:30 





 06/08/17 05:35 





 











 06/07/17 06/08/17 06/09/17





 05:59 05:59 05:59


 


Intake Total 2130 2000 


 


Output Total 2070 1450 750


 


Balance 60 550 -750








 











PT  12.7 SEC (12.0-15.0)   06/03/17  18:15    


 


INR  0.96  (0.83-1.16)   06/03/17  18:15    














ICD10 Worksheet


Patient Problems: 


 Problems











Problem Status Onset


 


Abdominal pain Acute  


 


MRSA (methicillin resistant Staphylococcus aureus) Acute  ~06/04/17


 


Shortness of breath Acute  


 


Urinary tract infection Acute  


 


Bladder cancer Acute  


 


Edema Acute  


 


Gross hematuria Acute  


 


Hydronephrosis, left Acute

## 2017-06-09 NOTE — HOSPPROG
Hospitalist Progress Note


Assessment/Plan: 


Patient protested discharge yesterday, because did not want to go back to 

[facility]. No acute events overnight





#Abd pain: improved since surgery





#Incarcerated ileal conduit parastomal hernia


-s/p repair. Healing well. Cleared from surgery for DC





#MRSA/Aerococcus in urine: suspect colonization. Afebrile without leukocytosis. 

No signs of bacteremia. No treatment warranted





#Right-sided hydronephritis: call out to Urology to discuss renogram. FU with 

them outpatient





#h/o bladder cancer: s/p cystoprostatectomy





#PAF: not on rate-controlling meds. Started ASA 81mg





#Deconditioning: walker at baseline. Emphasized that he needs to be out of bed 

walking the unit





#DVT ppx: Lovenox





#Disp: DC today


Subjective: no acute events


Objective: 


 Vital Signs











Temp Pulse Resp BP Pulse Ox


 


 36.7 C   77   16   123/70 H  90 L


 


 06/09/17 08:00  06/09/17 08:00  06/09/17 08:00  06/09/17 08:00  06/09/17 08:00








 Laboratory Results





 06/07/17 05:30 





 06/09/17 04:44 





 











 06/08/17 06/09/17 06/10/17





 05:59 05:59 05:59


 


Intake Total 2000 600 400


 


Output Total 1450 1500 400


 


Balance 550 -900 0








 











PT  12.7 SEC (12.0-15.0)   06/03/17  18:15    


 


INR  0.96  (0.83-1.16)   06/03/17  18:15    














- Physical Exam


Constitutional: no apparent distress


Eyes: PERRL


Ears, Nose, Mouth, Throat: moist mucous membranes


Cardiovascular: regular rate and rhythym, no murmur, rub, or gallop


Respiratory: no respiratory distress, no rales or rhonchi


Gastrointestinal: normoactive bowel sounds, other (surgical incision healing 

well)


Genitourinary: no bladder fullness, other (ostomy with clear, yellow urine)


Skin: warm


Musculoskeletal: full muscle strength


Neurologic: AAOx3


Psychiatric: depressed, flat affect





ICD10 Worksheet


Patient Problems: 


 Problems











Problem Status Onset


 


Abdominal pain Acute  


 


MRSA (methicillin resistant Staphylococcus aureus) Acute  ~06/04/17


 


Shortness of breath Acute  


 


Urinary tract infection Acute  


 


Bladder cancer Acute  


 


Edema Acute  


 


Gross hematuria Acute  


 


Hydronephrosis, left Acute

## 2017-06-10 NOTE — HOSPPROG
Hospitalist Progress Note


Assessment/Plan: 








#Herpes zoster: lesions new today. Start Acyclovir x 7 days.  Resp/contact 

precautions


-low-dose gabapentin for nerve pain qhs





#Acute incisional abdominal pain: improved since surgery





#Incarcerated ileal conduit parastomal hernia


-s/p repair. Healing well. Cleared from surgery for DC





#MRSA/Aerococcus in urine: suspect colonization. Afebrile without leukocytosis. 

No signs of bacteremia. No treatment warranted





#Right-sided hydronephritis: call out to Urology to discuss renogram. FU with 

them outpatient





#h/o bladder cancer: s/p cystoprostatectomy





#PAF: not on rate-controlling meds. Started ASA 81mg





#Deconditioning: walker at baseline. Emphasized that he needs to be out of bed 

walking the unit





#DVT ppx: Lovenox





#Disp: awaiting appeal processing for placement


Subjective: c/o new burning pain right abdomen, flank


Objective: 


 Vital Signs











Temp Pulse Resp BP Pulse Ox


 


 36.7 C   70   18   107/71   94 


 


 06/10/17 07:53  06/10/17 07:53  06/10/17 07:53  06/10/17 07:53  06/10/17 07:53








 Laboratory Results





 06/07/17 05:30 





 06/10/17 05:25 





 











 06/09/17 06/10/17 06/11/17





 05:59 05:59 05:59


 


Intake Total 600 900 


 


Output Total 1500 1350 200


 


Balance -900 -450 -200








 











PT  12.7 SEC (12.0-15.0)   06/03/17  18:15    


 


INR  0.96  (0.83-1.16)   06/03/17  18:15    














- Physical Exam


Constitutional: obese


Eyes: PERRL


Ears, Nose, Mouth, Throat: moist mucous membranes, hard of hearing


Cardiovascular: regular rate and rhythym, no murmur, rub, or gallop


Respiratory: no respiratory distress, no rales or rhonchi


Gastrointestinal: normoactive bowel sounds, soft, non-tender abdomen


Genitourinary: no bladder fullness, other (ostomy with yellow urine)


Skin: warm, other (vesicles in deermatomal distribution along right abdomen, 

flank new today)


Musculoskeletal: full muscle strength


Neurologic: AAOx3, asterixes


Psychiatric: interacting appropriately





ICD10 Worksheet


Patient Problems: 


 Problems











Problem Status Onset


 


Abdominal pain Acute  


 


MRSA (methicillin resistant Staphylococcus aureus) Acute  ~06/04/17


 


Shortness of breath Acute  


 


Urinary tract infection Acute  


 


Bladder cancer Acute  


 


Edema Acute  


 


Gross hematuria Acute  


 


Hydronephrosis, left Acute

## 2017-06-11 NOTE — GDS
[f 
rep st]



                                                             DISCHARGE SUMMARY





Please refer to my initial discharge summary on 06/08/17, for full details as 
the patient was to be discharged but appealed his discharge back to [facility].



DISCHARGE DIAGNOSES:  

1.  Parasternal hernia, status post repair.  

2.  Right hydronephrosis

3.  Methicillin-resistant Staphylococcus aureus/Aerococcus in urine, suspected 
colonization.

4.  History of bladder cancer, status post cystoprostatectomy with ileal 
conduit.  

5.  Paroxysmal atrial fibrillation.

6.  Deconditioning.

7.  Herpes zoster.

8.  Chronic hypoxemic respiratory failure.



HOSPITAL COURSE:  Again, please see initial discharge summary dated 06/08/17.  
New issue since that summary:

1.  Herpes zoster:  Patient developed vesicles the day prior to discharge along 
his right abdomen and flank.  He will complete a 7-day course of acyclovir.  
Started gabapentin 300 mg at bedtime.  This can be up titrated slowly to 
prevent sedation.

2.  Chronic hypoxemic respiratory failure.  The patient is on oxygen at night.



FOLLOWUP:  

1.  Dr. Martines with Urology.

2.  Dr. Kaufman with Surgery.

3.  Recommend Psychiatric Services.  



Up titrate Neurontin slowly for neuropathic pain associated with zoster.





Job #:  [id number]

MTDD

## 2017-06-11 NOTE — HOSPPROG
Hospitalist Progress Note


Assessment/Plan: 








#Herpes zoster: Acyclovir x 7 days.  Resp/contact precautions


-low-dose gabapentin for nerve pain qhs. Titrate slowly 





#Acute incisional abdominal pain: improved since surgery





#Incarcerated ileal conduit parastomal hernia


-s/p repair. Healing well. Cleared from surgery for DC





#MRSA/Aerococcus in urine: suspect colonization. Afebrile without leukocytosis. 

No signs of bacteremia. No treatment warranted





#Right-sided hydronephritis: call out to Urology to discuss renogram. FU with 

them outpatient





#h/o bladder cancer: s/p cystoprostatectomy





#PAF: not on rate-controlling meds. Started ASA 81mg





#Deconditioning: walker at baseline. Emphasized that he needs to be out of bed 

walking the unit





#DVT ppx: Lovenox





#Disp: DC today


Subjective: c/o 1 episode loose stool


Objective: 


 Vital Signs











Temp Pulse Resp BP Pulse Ox


 


 36.5 C   81   18   124/80 H  92 


 


 06/11/17 08:55  06/11/17 08:55  06/11/17 08:55  06/11/17 08:55  06/11/17 08:55








 Laboratory Results





 06/07/17 05:30 





 06/11/17 05:24 





 











 06/10/17 06/11/17 06/12/17





 05:59 05:59 05:59


 


Intake Total 900 1750 400


 


Output Total 1350 925 500


 


Balance -450 825 -100








 











PT  12.7 SEC (12.0-15.0)   06/03/17  18:15    


 


INR  0.96  (0.83-1.16)   06/03/17  18:15    














- Physical Exam


Constitutional: no apparent distress


Eyes: PERRL


Ears, Nose, Mouth, Throat: moist mucous membranes


Cardiovascular: regular rate and rhythym, no murmur, rub, or gallop


Respiratory: no respiratory distress


Gastrointestinal: normoactive bowel sounds, other (incision site C/D/I)


Genitourinary: no bladder fullness


Skin: warm, other (closed vesicles along right abd and flank)


Musculoskeletal: full muscle strength, other (walking with walker)


Neurologic: AAOx3, CN II-XII Intact


Psychiatric: depressed, flat affect





ICD10 Worksheet


Patient Problems: 


 Problems











Problem Status Onset


 


Abdominal pain Acute  


 


MRSA (methicillin resistant Staphylococcus aureus) Acute  ~06/04/17


 


Shortness of breath Acute  


 


Urinary tract infection Acute  


 


Bladder cancer Acute  


 


Edema Acute  


 


Gross hematuria Acute  


 


Hydronephrosis, left Acute

## 2017-06-11 NOTE — SOAPPROG
SOAP Progress Note


Assessment/Plan: 


Assessment:


wound ok/ uo ok/ afebrile/ abd soft/ vs stable























Plan:advance diet





06/04/17 10:22





06/05/17 22:33


SEEN EARLIER THIS AM/ WOUND OK/ AFEBRILE/ CO BLOATING/ ABD SOFT, MILDLY 

DISTENDED WITH BS/ STOMA PINK


PROBABLE CONSTIPATION/  CHECK 2-WAY/ CATHARSIS


06/08/17 18:54





WOUND OKAY/OSTOMY OKAY/ AFEBRILE/ URINE OUTPUT GREAT /      VERY SLOW TO 

MOBILIZE / HOPEFULLY HOME THIS WEEKEND


06/10/17 16:40


 wound okay/afebrile/eating well / still complains of incisional pain but is 

incision looks great / ostomy okay /urine output good / home soon


06/11/17 14:27


 WOUND OKAY/ AFEBRILE/ OSTOMY OKAY/ EATING OKAY/     NOW APPEARS TO HAVE A 

SHINGLES AS A CAUSE OF HIS PAIN / ON ACYCLOVIR


Objective: 





 Vital Signs











Temp Pulse Resp BP Pulse Ox


 


 36.5 C   81   18   124/80 H  92 


 


 06/11/17 08:55  06/11/17 08:55  06/11/17 08:55  06/11/17 08:55  06/11/17 08:55








 Laboratory Results





 06/07/17 05:30 





 06/11/17 05:24 





 











 06/10/17 06/11/17 06/12/17





 05:59 05:59 05:59


 


Intake Total 900 1750 400


 


Output Total 1350 925 500


 


Balance -450 825 -100








 











PT  12.7 SEC (12.0-15.0)   06/03/17  18:15    


 


INR  0.96  (0.83-1.16)   06/03/17  18:15    














ICD10 Worksheet


Patient Problems: 


 Problems











Problem Status Onset


 


Abdominal pain Acute  


 


MRSA (methicillin resistant Staphylococcus aureus) Acute  ~06/04/17


 


Shortness of breath Acute  


 


Urinary tract infection Acute  


 


Bladder cancer Acute  


 


Edema Acute  


 


Gross hematuria Acute  


 


Hydronephrosis, left Acute

## 2017-06-11 NOTE — PDIAF
- Diagnosis


Diagnosis: parastomal hernia, s/p repair


Code Status: Full Code





- Medication Management


Discharge Medications: 


 Medications to Continue on Transfer





Acetaminophen [Tylenol 325mg (*)] 650 mg PO Q6 PRN 06/03/17 [Last Taken Unknown]


Loperamide HCl [Loperamide] 2 mg PO DAILY PRN 06/03/17 [Last Taken Unknown]


Aspirin EC [Aspirin EC 81 mg (*)] 81 mg PO DAILY  tab 06/08/17 [Last Taken 

Unknown]


Sennosides/Docusate Sodium [Senokot-S] 1 - 2 tab PO BID  tab 06/08/17 [Last 

Taken Unknown]


oxyCODONE IR [Oxycodone Ir (*)] 5 mg PO Q6H PRN #30 tab 06/08/17 [Last Taken 

Unknown]








Discharge Medications: Refer to the Discharge Home Medication list for PRN 

reason.





- Orders


Services needed: Registered Nurse (and psych services), Certified Nursing Aide, 

Master  (referral for psychiatric services), Physical Therapy


Diet Recommendation: no restrictions on diet


Diet Texture: Regular Texture Diet





- Follow Up Care


Current Providers and Referrals: 


Jewel Martines MD [Medical Doctor] - follow up in 2 weeks

## 2017-06-17 NOTE — EDPHY
H & P


Time Seen by Provider: 06/17/17 17:27


HPI/ROS: 





CHIEF COMPLAINT:  Abdominal pain





HISTORY OF PRESENT ILLNESS:  Patient is a 66-year-old man with a history of 

bladder cancer status post cystoprostatectomy with a urostomy bag from his 

right lower quadrant as well as a right ureteral stent for history of 

hydronephrosis.  He was seen here 10 days ago for a parastomal hernia.  He 

underwent repair by surgery with Dr. Kaufman and did well postoperatively other 

than developing shingles.  Yesterday he and his nurse noticed that the ureteral 

stent which had been poking out of his urostomy disappeared inside of him.  He 

states that since that time he has had increased right lower quadrant pain.  No 

fevers.  No vomiting or nausea.  He has had normal bowel movements.  Continues 

to have normal urine output into his ostomy bag.








REVIEW OF SYSTEMS:


Constitutional:  denies: chills, fever, recent illness, recent injury


EENTM: denies: blurred vision, double vision, nose congestion


Respiratory: denies: cough, shortness of breath


Cardiac: denies: chest pain, irregular heart rate, lightheadedness, palpitations


Gastrointestinal/Abdominal:  See HPI


Genitourinary: denies: dysuria, frequency, hematuria, pain


Musculoskeletal: denies: joint pain, muscle pain


Skin: denies: lesions, rash, jaundice, bruising


Neurological: denies: headache, numbness, paresthesia, tingling, dizziness, 

weakness


Hematologic/Lymphatic: denies: blood clots, easy bleeding, easy bruising


Immunologic/allergic: denies: HIV/AIDS, transplant








EXAM:


GENERAL:  Well-appearing, well-nourished and in no acute distress.


HEAD:  Atraumatic, normocephalic.


EYES:  Pupils equal round and reactive to light, extraocular movements intact, 

sclera anicteric, conjunctiva are normal.


ENT:  TMs normal, nares patent, oropharynx clear without exudates.  Moist 

mucous membranes.


NECK:  Normal range of motion, supple without lymphadenopathy or JVD.


LUNGS:  Breath sounds clear to auscultation bilaterally and equal.  No wheezes 

rales or rhonchi.


HEART:  Regular rate and rhythm without murmurs, rubs or gallops.


ABDOMEN:  Mildly distended, pain in right lower quadrant, ostomy intact, 

incision intact clean and dry, no tenderness to palpation. 


BACK:  No CVA tenderness, no spinal tenderness, step-offs or deformities


EXTREMITIES:  Normal range of motion, no pitting or edema.  No clubbing or 

cyanosis.


NEUROLOGICAL:  Cranial nerves II through XII grossly intact.  Normal speech, 

normal gait.  5/5 strength, normal movement in all extremities, normal sensation


PSYCH:  Normal mood, normal affect.


SKIN:  Warm, dry, normal turgor, no visible rashes or lesions.








Source: Patient


Exam Limitations: No limitations





- Personal History


Tetanus Vaccine Date: 2015





- Medical/Surgical History


Hx Asthma: No


Hx Chronic Respiratory Disease: No


Hx Diabetes: No


Hx Cardiac Disease: No


Hx Renal Disease: No


Hx Cirrhosis: No


Hx Alcoholism: No


Hx HIV/AIDS: No


Hx Splenectomy or Spleen Trauma: No


Other PMH: bilateral knee surgeries, left hip surgery, appy, bladder CA w 

nephrostomy & urostomy





- Family History


Significant Family History: No pertinent family hx





- Social History


Smoking Status: Current some day smoker


Alcohol Use: Sober


Drug Use: None


Constitutional: 


 Initial Vital Signs











Temperature (C)  36.4 C   06/17/17 17:20


 


Heart Rate  84   06/17/17 17:20


 


Respiratory Rate  16   06/17/17 17:20


 


Blood Pressure  145/91 H  06/17/17 17:20


 


O2 Sat (%)  92   06/17/17 17:20








 











O2 Delivery Mode               Room Air


 


O2 (L/minute)                  2














Allergies/Adverse Reactions: 


 





No Known Allergies Allergy (Unverified 06/03/17 21:42)


 








Home Medications: 














 Medication  Instructions  Recorded


 


Acetaminophen [Tylenol 325mg (*)] 650 mg PO Q6 PRN 06/03/17


 


Loperamide HCl [Loperamide] 2 mg PO DAILY PRN 06/03/17


 


Aspirin EC [Aspirin EC 81 mg (*)] 81 mg PO DAILY  tab 06/08/17


 


Sennosides/Docusate Sodium 1 - 2 tab PO BID  tab 06/08/17





[Senokot-S]  


 


Acyclovir [Zovirax 400 mg (*)] 800 mg PO 5XD #30 tab 06/11/17


 


Norco  Tablet  06/17/17


 


Prochlorperazine Maleate  06/17/17














Medical Decision Making





- Diagnostics


Imaging Results: 


 Imaging Impressions





Abdomen CT  06/17/17 17:35


Impression: 


1. Interval postsurgical changes of repair of the hernia adjacent to the stoma 

for the urinary diversion. There is a 6 x 3 cm fluid collection which could 

represent postoperative hematoma or seroma. Abscess would be less likely.


2. The left ureteral stent has been removed over the interval. Stable 

dilatation of the collecting systems bilaterally and ureter felt to be 

secondary to a capacious collecting system and nonobstructed per recent nuclear 

medicine study.


3. Other chronic findings, as above which are stable.


 


Results called and discussed with Ralf Guillen MD on June 17, 2017 at 1925 

hours.











Imaging: Discussed imaging studies w/ On call Radiologist


ED Course/Re-evaluation: 





8:00 p.m. I discussed the case and reviewed the images with Dr. Maribel Lopez.  

She suspects that this is a hematoma.  The patient is nontoxic-appearing and 

has a nontender abdomen.  His lab work is unremarkable. Will discharge home and 

he will follow up with Dr. Kaufman this week.  He understands this plan.  Looks 

as though he has passed his ureteral stent which Dr. Martines had planned to 

remove any ways.  He has mild hydronephrosis which is unchanged from previous 

imaging.


Differential Diagnosis: 





Partial list of the Differential diagnosis considered include but were not 

limited to;  hematoma, seroma and although unlikely based on the history and 

physical exam, I also considered abscess, perforation, ischemia, volvulus, 

obstruction, ureteral injury.  I discussed these differential diagnoses and the 

plan with the patient as well as the usual and expected course.  The patient 

understands that the diagnosis is provisional and that in medicine we are not 

always correct and that further workup is often warranted.  Usual and customary 

warnings were given.  All of the patient's questions were answered.  The 

patient was instructed to return to the emergency department should the 

symptoms at all worsen or return, otherwise to followup with the physician as 

we discussed.





- Data Points


Laboratory Results: 


 Laboratory Results





 06/17/17 17:50 





 06/17/17 17:50 





 











  06/17/17 06/17/17





  17:50 17:50


 


WBC    7.44 10^3/uL 10^3/uL





    (3.80-9.50) 


 


RBC    3.64 10^6/uL L 10^6/uL





    (4.40-6.38) 


 


Hgb    10.4 g/dL L g/dL





    (13.7-17.5) 


 


Hct    33.6 % L %





    (40.0-51.0) 


 


MCV    92.3 fL fL





    (81.5-99.8) 


 


MCH    28.6 pg pg





    (27.9-34.1) 


 


MCHC    31.0 g/dL L g/dL





    (32.4-36.7) 


 


RDW    15.4 % H %





    (11.5-15.2) 


 


Plt Count    289 10^3/uL 10^3/uL





    (150-400) 


 


MPV    8.8 fL fL





    (8.7-11.7) 


 


Neut % (Auto)    70.6 % %





    (39.3-74.2) 


 


Lymph % (Auto)    17.1 % %





    (15.0-45.0) 


 


Mono % (Auto)    8.1 % %





    (4.5-13.0) 


 


Eos % (Auto)    3.1 % %





    (0.6-7.6) 


 


Baso % (Auto)    0.7 % %





    (0.3-1.7) 


 


Nucleat RBC Rel Count    0.0 % %





    (0.0-0.2) 


 


Absolute Neuts (auto)    5.26 10^3/uL 10^3/uL





    (1.70-6.50) 


 


Absolute Lymphs (auto)    1.27 10^3/uL 10^3/uL





    (1.00-3.00) 


 


Absolute Monos (auto)    0.60 10^3/uL 10^3/uL





    (0.30-0.80) 


 


Absolute Eos (auto)    0.23 10^3/uL 10^3/uL





    (0.03-0.40) 


 


Absolute Basos (auto)    0.05 10^3/uL 10^3/uL





    (0.02-0.10) 


 


Absolute Nucleated RBC    0.00 10^3/uL 10^3/uL





    (0-0.01) 


 


Immature Gran %    0.4 % %





    (0.0-1.1) 


 


Immature Gran #    0.03 10^3/uL 10^3/uL





    (0.00-0.10) 


 


Sodium  141 mEq/L mEq/L  





   (134-144)  


 


Potassium  4.6 mEq/L mEq/L  





   (3.5-5.2)  


 


Chloride  106 mEq/L mEq/L  





   ()  


 


Carbon Dioxide  24 mEq/l mEq/l  





   (22-31)  


 


Anion Gap  11 mEq/L mEq/L  





   (8-16)  


 


BUN  28 mg/dL H mg/dL  





   (7-23)  


 


Creatinine  1.0 mg/dL mg/dL  





   (0.7-1.3)  


 


Estimated GFR  > 60   





   


 


Glucose  96 mg/dL mg/dL  





   ()  


 


Calcium  9.0 mg/dL mg/dL  





   (8.5-10.4)  


 


Total Bilirubin  0.2 mg/dL mg/dL  





   (0.1-1.4)  


 


Conjugated Bilirubin  0.2 mg/dL mg/dL  





   (0.0-0.5)  


 


Unconjugated Bilirubin  0.0 mg/dL mg/dL  





   (0.0-1.1)  


 


AST  18 IU/L IU/L  





   (17-59)  


 


ALT  28 IU/L IU/L  





   (21-72)  


 


Alkaline Phosphatase  79 IU/L IU/L  





   ()  


 


Total Protein  7.1 g/dL g/dL  





   (6.3-8.2)  


 


Albumin  3.7 g/dL g/dL  





   (3.5-5.0)  


 


Lipase  42.0 IU/L IU/L  





   ()  











Medications Given: 


 








Discontinued Medications





Hydromorphone HCl (Dilaudid)  1 mg IVP EDNOW ONE


   Stop: 06/17/17 17:35


   Last Admin: 06/17/17 18:03 Dose:  1 mg


Hydromorphone HCl (Dilaudid)  1 mg IVP EDNOW ONE


   Stop: 06/17/17 20:05


   Last Admin: 06/17/17 20:18 Dose:  1 mg


Sodium Chloride (Ns)  1,000 mls @ 0 mls/hr IV ONCE ONE; Wide Open


   PRN Reason: Protocol


   Stop: 06/17/17 17:35


   Last Admin: 06/17/17 18:03 Dose:  1,000 mls








Departure





- Departure


Disposition: Home, Routine, Self-Care


Clinical Impression: 


Abdominal wall hematoma


Qualifiers:


 Encounter type: initial encounter Qualified Code(s): S30.1XXA - Contusion of 

abdominal wall, initial encounter





Condition: Fair


Instructions:  Hematoma (ED)


Referrals: 


Patient,NotPresent [Unknown] - As per Instructions


Dilip Kaufman MD [Medical Doctor] - 3-4 days, if not improved

## 2017-08-15 NOTE — EDPHY
H & P


Stated Complaint: R sided abd pain


Time Seen by Provider: 08/15/17 08:23


HPI/ROS: 





CHIEF COMPLAINT:  Abdominal pain





HISTORY OF PRESENT ILLNESS:  The patient is a 67-year-old man with history of 

bladder cancer status post bladder and prostate resection with urostomy bag in 

his right lower quadrant who was seen earlier this summer for a parastomal 

incarcerated hernia and taken to the operating room by Dr. Kaufman.  He recovered 

well other than an episode of shingles postoperatively.  I saw him in June 

because he was concerned that a kidney stent had been lost.  We discovered a 

hematoma on CT scan and he followed up with Dr. Kaufman as an outpatient.  He is 

currently at the nursing home and yesterday was complaining of diffuse 

abdominal pain. They performed an x-ray and told him that he had constipation 

and wanted to perform a rectal exam.  The patient adamantly refused this so 

they sent him to the hospital.  Patient tells me that he had a bowel movement 

yesterday that he usually has 1 every 2 or 3 days.  He continues to pass gas.  

He has not been febrile.  He has not been vomiting. He has not had any change 

in his urostomy output.





REVIEW OF SYSTEMS:


Constitutional:  denies: chills, fever, recent illness, recent injury


EENTM: denies: blurred vision, double vision, nose congestion


Respiratory: denies: cough, shortness of breath


Cardiac: denies: chest pain, irregular heart rate, lightheadedness, palpitations


Gastrointestinal/Abdominal:  See HPI


Genitourinary: denies: dysuria, frequency, hematuria, pain


Musculoskeletal: denies: joint pain, muscle pain


Skin: denies: lesions, rash, jaundice, bruising


Neurological: denies: headache, numbness, paresthesia, tingling, dizziness, 

weakness


Hematologic/Lymphatic: denies: blood clots, easy bleeding, easy bruising


Immunologic/allergic: denies: HIV/AIDS, transplant








EXAM:


GENERAL:  Well-appearing, well-nourished and in no acute distress.


HEAD:  Atraumatic, normocephalic.


EYES:  Pupils equal round and reactive to light, extraocular movements intact, 

sclera anicteric, conjunctiva are normal.


ENT:  TMs normal, nares patent, oropharynx clear without exudates.  Moist 

mucous membranes.


NECK:  Normal range of motion, supple without lymphadenopathy or JVD.


LUNGS:  Breath sounds clear to auscultation bilaterally and equal.  No wheezes 

rales or rhonchi.


HEART:  Regular rate and rhythm without murmurs, rubs or gallops.


ABDOMEN:  Distended, mild diffuse pain, mild tenderness, urostomy bag in place, 

clear urine, no obvious hernia, incisions intact, 


BACK:  No CVA tenderness, no spinal tenderness, step-offs or deformities


EXTREMITIES:  Normal range of motion, no pitting or edema.  No clubbing or 

cyanosis.


NEUROLOGICAL:  Cranial nerves II through XII grossly intact.  Normal speech, 

normal gait.  5/5 strength, normal movement in all extremities, normal sensation


PSYCH:  Normal mood, normal affect.


SKIN:  Warm, dry, normal turgor, no visible rashes or lesions.








Source: Patient


Exam Limitations: No limitations





- Personal History


Current Tetanus/Diphtheria Vaccine: Yes


Current Tetanus Diphtheria and Acellular Pertussis (TDAP): Yes


Tetanus Vaccine Date: 2015





- Medical/Surgical History


Hx Asthma: No


Hx Chronic Respiratory Disease: No


Hx Diabetes: No


Hx Cardiac Disease: No


Hx Renal Disease: No


Hx Cirrhosis: No


Hx Alcoholism: No


Hx HIV/AIDS: No


Hx Splenectomy or Spleen Trauma: No


Other PMH: hernia surg. bilateral knee surgeries, left hip surgery, appy, 

bladder CA w nephrostomy & urostomy





- Family History


Significant Family History: No pertinent family hx





- Social History


Smoking Status: Current some day smoker


Alcohol Use: Sober


Drug Use: None


Constitutional: 


 Initial Vital Signs











Temperature (C)  36.7 C   08/15/17 08:03


 


Heart Rate  72   08/15/17 08:03


 


Respiratory Rate  16   08/15/17 08:03


 


Blood Pressure  141/96 H  08/15/17 08:03


 


O2 Sat (%)  92   08/15/17 08:03








 











O2 Delivery Mode               Room Air














Allergies/Adverse Reactions: 


 





No Known Allergies Allergy (Unverified 06/03/17 21:42)


 








Home Medications: 














 Medication  Instructions  Recorded


 


Acetaminophen [Tylenol 325mg (*)] 650 mg PO Q6 PRN 06/03/17


 


Loperamide HCl [Loperamide] 2 mg PO DAILY PRN 06/03/17


 


Aspirin EC [Aspirin EC 81 mg (*)] 81 mg PO DAILY  tab 06/08/17


 


Sennosides/Docusate Sodium 1 - 2 tab PO BID  tab 06/08/17





[Senokot-S]  


 


Acyclovir [Zovirax 400 mg (*)] 800 mg PO 5XD #30 tab 06/11/17


 


Norco  Tablet  06/17/17


 


Prochlorperazine Maleate  06/17/17














Medical Decision Making





- Diagnostics


Imaging: Discussed imaging studies w/ On call Radiologist


ED Course/Re-evaluation: 





Patient's CT and lab work is all very reassuring.  His abdominal exam is 

benign.  He feels much better.  We will discharge him to the nursing home.  We 

discussed indications for returning.


Differential Diagnosis: 





Partial list of the Differential diagnosis considered include but were not 

limited to;  gastritis, constipation, small-bowel obstruction, diverticulitis 

and although unlikely based on the history and physical exam, I also considered 

kidney stone, biliary disease, ischemia, volvulus.  I discussed these 

differential diagnoses and the plan with the patient as well as the usual and 

expected course.  The patient understands that the diagnosis is provisional and 

that in medicine we are not always correct and that further workup is often 

warranted.  Usual and customary warnings were given.  All of the patient's 

questions were answered.  The patient was instructed to return to the emergency 

department should the symptoms at all worsen or return, otherwise to followup 

with the physician as we discussed.





- Data Points


Laboratory Results: 


 Laboratory Results





 08/15/17 08:40 





 08/15/17 08:40 








Medications Given: 


 








Discontinued Medications





Sodium Chloride (Ns)  1,000 mls @ 0 mls/hr IV EDNOW ONE; Wide [facility]


   PRN Reason: Protocol


   Stop: 08/15/17 08:29


   Last Admin: 08/15/17 08:43 Dose:  1,000 mls








Departure





- Departure


Disposition: Home, Routine, Self-Care


Clinical Impression: 


Abdominal pain


Qualifiers:


 Abdominal location: generalized Qualified Code(s): R10.84 - Generalized 

abdominal pain





Condition: Fair


Instructions:  Abdominal Pain (ED)


Referrals: 


JUAN M ACUNA [Primary Care Provider] - As per Instructions

## 2017-08-21 NOTE — EDPHY
H & P


Time Seen by Provider: 08/21/17 15:00


HPI/ROS: 





HPI


Possible urinary tract infection, aggressive behavior.  





67-year-old male from [facility].  He is on an M1 hold.  State 

regular toward report visiting the site of [facility].  The 

patient just returned from lunch apparently with his son.  He started becoming 

aggressive toward staff.  He apparently has required more 1 on 1 supervision.  

Our nursing staff reports that he was sent here because this incident occurred 

in front of the state regulation board.  The patient denies any complaints to 

me.  He denies being significantly depressed.  He denies suicidal ideations.





ROS:





Constitutional:  No fever, no chills.  No weakness.


Eyes:  No discharge.  No changes in vision.


ENT:  No sore throat.  No nasal congestion or rhinorrhea.


Respiratory:  No cough.  No shortness of breath.


Cardiac:  No chest pain, no palpitations.


Gastrointestinal:  No abdominal pain, no vomiting, no diarrhea.


Genitourinary:  No hematuria.  No dysuria or increased frequency with urination.


Musculoskeletal:  No back pain.  No neck pain.  No myalgias or arthralgias.


Skin:  No rashes.


Neurological:  No headache.  No focal weakness or altered sensation.





Past medical history:  Bladder cancer with nephrostomy and urostomy.  Bilateral 

knee surgeries, hernias, left hip surgery.





Social history:  Here by himself.  Denies smoking.  No alcohol.





Physical Exam:





General Appearance:  Alert, no distress.  This patient is responding to 

questions appropriately and in full sentences.  This patient appears well-

hydrated and well-nourished.


Eyes:  Pupils equal and round no pallor or injection.  No lid edema, erythema 

or injection.


Respiratory:  There are no retractions, lungs are clear to auscultation with 

good air movement bilaterally.


Cardiovascular:  Regular rate and rhythm.  No murmur.


Gastrointestinal:  Distended which is normal habitus.  Large midline surgical 

scar periumbilical region to pubis.  Nephrostomy bag right lower quadrant, 

insertion site clean dry and intact.  Abdomen is soft and nontender, no masses, 

bowel sounds normal.  No focal tenderness at McBurney's point.  No Wall sign.


Neurological:  Motor sensory function is grossly intact.  Cranial nerves are 

normal.  Gait is normal.


Skin:  Warm and dry, no rashes.


Musculoskeletal:  Neck is supple and nontender.


Extremities are symmetrical.  All joints range without pain or impingement.


Psychiatric:  No agitation.  No depression.





Database:





EKG:





Imaging:





Procedures:





Emergency department course:





The patient is not suicidal.  He denies significant depression.  I will 

evaluate him for possible urinary tract infection.  His M1 will be lifted.  

Plan will be to treat urinary tract infection if needed and sent him back to 

[facility].





4:50 p.m., urinalysis significant for urinary tract infection.  Plan will be to 

place the patient on Keflex.  He was started on Keflex 500 mg orally in the 

emergency department.  He will be sent back to [facility] with a prescription 

for this medication.  Instructions on follow up with his primary care physician 

were discussed.  Return to emergency department precautions reviewed.  He again 

reiterated to me that he was not suicidal or significantly depressed.  His M1 

hold was released by myself.  He was discharged back to [facility] 

in good condition.





Differential Diagnosis:





The differential diagnosis on this patient includes but is not limited to 

urinary tract infection.  Suicidal ideation, acute psychotic break unlikely.  

This represents a partial list of diagnoses considered.  These considerations 

are based on history, physical exam, past history, reassessment and diagnostic 

testing.


Smoking Status: Current some day smoker


Constitutional: 


 Initial Vital Signs











Temperature (C)  37.1 C   08/21/17 15:04


 


Heart Rate  82   08/21/17 15:04


 


Respiratory Rate  18   08/21/17 15:04


 


Blood Pressure  126/81 H  08/21/17 15:04


 


O2 Sat (%)  96   08/21/17 15:04








 











O2 Delivery Mode               Room Air


 


O2 (L/minute)                  2














Allergies/Adverse Reactions: 


 





No Known Allergies Allergy (Verified 08/21/17 15:03)


 








Home Medications: 














 Medication  Instructions  Recorded


 


Acetaminophen [Tylenol 325mg (*)] 650 mg PO Q6 PRN 06/03/17


 


Loperamide HCl [Loperamide] 2 mg PO DAILY PRN 06/03/17


 


Aspirin EC [Aspirin EC 81 mg (*)] 81 mg PO DAILY  tab 06/08/17


 


Sennosides/Docusate Sodium 1 - 2 tab PO BID  tab 06/08/17





[Senokot-S]  


 


Acyclovir [Zovirax 400 mg (*)] 800 mg PO 5XD #30 tab 06/11/17


 


Norco  Tablet  06/17/17


 


Prochlorperazine Maleate  06/17/17


 


Cephalexin [Keflex (*)] 500 mg PO Q6 7 Days 08/21/17














Medical Decision Making





- Data Points


Medications Given: 


 








Discontinued Medications





Hydrocodone Bitart/Acetaminophen (Norco 10/325)  1 tab PO EDNOW ONE


   Stop: 08/21/17 16:28


   Last Admin: 08/21/17 16:35 Dose:  Not Given


Hydrocodone Bitart/Acetaminophen (Norco 5/325)  2 tab PO EDNOW ONE


   Stop: 08/21/17 16:33


   Last Admin: 08/21/17 16:34 Dose:  2 tab


Cephalexin HCl (Keflex)  500 mg PO EDNOW ONE


   PRN Reason: Protocol


   Stop: 08/21/17 16:57


   Last Admin: 08/21/17 17:21 Dose:  500 mg








Departure





- Departure


Disposition: Home, Routine, Self-Care


Clinical Impression: 


 Urinary tract infection





Condition: Good


Instructions:  Urinary Tract Infection in Men (ED)


Additional Instructions: 


Read and follow provided instructions.





Follow-up with your primary care physician in 2-3 days for re-evaluation.





Take medication as prescribed through entire course of treatment.





Return to the emergency department for fever, back pain, abdominal pain, 

vomiting or other serious concerns.


Referrals: 


JUAN M ACUNA [Primary Care Provider] - As per Instructions


Prescriptions: 


Cephalexin [Keflex (*)] 500 mg PO Q6 7 Days

## 2017-10-31 NOTE — EDPHY
H & P


HPI/ROS: 





HPI





CHIEF COMPLAINT:  Urostomy bag leaking.





HISTORY OF PRESENT ILLNESS:  This patient very pleasant 67-year-old male 

otherwise healthy, does have significant past medical history for bladder 

cancer status post bladder removal and urostomy, AFib, deconditioning, presents 

to the emergency room by EMS from a local shelter.  He presents due to his 

urostomy bag leaking.  He denies any fever vomiting chest pain shortness of 

breath or significant abdominal pain.





Past Medical History:  AFib, proximal, bladder cancer, deconditioning





Past Surgical History:  Bladder removal, urostomy





Social History:  Denies daily use drugs alcohol tobacco products.  Resides at a 

local [facility], homeless.





Family History:  Noncontributory








ROS   


REVIEW OF SYSTEMS:


A comprehensive 10 point review of systems is otherwise negative aside from 

elements mentioned in the history of present illness.








Exam   


Constitutional   appears well nontoxic, triage nursing summary reviewed, vital 

signs reviewed, awake/alert. 


Eyes   normal conjunctivae and sclera, EOMI, PERRLA. 


HENT   normal inspection, atraumatic, moist mucus membranes, no epistaxis, neck 

supple/ no meningismus, no raccoon eyes. 


Respiratory   clear to auscultation bilaterally, normal breath sounds, no 

respiratory distress, no wheezing. 


Cardiovascular   rate normal, regular rhythm, no murmur, no edema, distal 

pulses normal. 


Gastrointestinal  urostomy bag located right lower abdomen, waking up the side 

of the bed, otherwise soft, non-tender, no rebound, no guarding, normal bowel 

sounds, no distension, no pulsatile mass. 


Genitourinary   no CVA tenderness. 


Musculoskeletal  no midline vertebral tenderness, full range of motion, no calf 

swelling, no tenderness of extremities, no meningismus, good pulses, 

neurovascularly intact.


Skin   pink, warm, & dry, no rash, skin atraumatic. 


Neurologic   awake, alert and oriented x 3, AAOx3, moves all 4 extremities 

equally, motor intact, sensory intact, CN II-XII intact, normal cerebellar, 

normal vision, normal speech. 


Psychiatric   normal mood/affect. 


Heme/Lymph/Immune   no lymphadenopathy.





Differential Diagnosis:  Includes but is not limited to in a particular order 

urostomy bag leaking, need for urostomy bag change,





Medical Decision Making:  Plan for this patient he is leaking urostomy bag 

other lateral aspect of the back.  Will replace his bag.





Re-evaluation:











Source: Patient, EMS





- Personal History


Tetanus Vaccine Date: 2015





- Medical/Surgical History


Hx Asthma: No


Hx Chronic Respiratory Disease: No


Hx Diabetes: No


Hx Cardiac Disease: No


Hx Renal Disease: No


Hx Cirrhosis: No


Hx Alcoholism: No


Hx HIV/AIDS: No


Hx Splenectomy or Spleen Trauma: No


Other PMH: hernia surg. bilateral knee surgeries, left hip surgery, appy, 

bladder CA w nephrostomy & urostomy





- Social History


Smoking Status: Current some day smoker


Allergies/Adverse Reactions: 


 





No Known Allergies Allergy (Verified 08/21/17 15:03)


 








Home Medications: 














 Medication  Instructions  Recorded


 


Acetaminophen [Tylenol 325mg (*)] 650 mg PO Q6 PRN 06/03/17


 


Loperamide HCl [Loperamide] 2 mg PO DAILY PRN 06/03/17


 


Aspirin EC [Aspirin EC 81 mg (*)] 81 mg PO DAILY  tab 06/08/17


 


Sennosides/Docusate Sodium 1 - 2 tab PO BID  tab 06/08/17





[Senokot-S]  


 


Acyclovir [Zovirax 400 mg (*)] 800 mg PO 5XD #30 tab 06/11/17


 


Norco  Tablet  06/17/17


 


Prochlorperazine Maleate  06/17/17


 


Cephalexin [Keflex (*)] 500 mg PO Q6 7 Days  cap 08/21/17














Departure





- Departure


Disposition: Home, Routine, Self-Care


Clinical Impression: 


 Attention to urostomy





Condition: Good


Instructions:  Urostomy Care (ED)


Additional Instructions: 


1. Return emergency room if develops worsening symptoms questions or concerns.

## 2017-10-31 NOTE — EDPHY
H & P


HPI/ROS: 





HPI





CHIEF COMPLAINT:  Urostomy bag leaking.





HISTORY OF PRESENT ILLNESS:  This patient very pleasant 67-year-old male 

otherwise healthy, does have significant past medical history for bladder 

cancer status post bladder removal and urostomy, AFib, deconditioning, presents 

to the emergency room by EMS from a local shelter.  He presents due to his 

urostomy bag leaking.  He denies any fever vomiting chest pain shortness of 

breath or significant abdominal pain.





Past Medical History:  AFib, proximal, bladder cancer, deconditioning





Past Surgical History:  Bladder removal, urostomy





Social History:  Denies daily use drugs alcohol tobacco products.  Resides at a 

local [facility], homeless.





Family History:  Noncontributory








ROS   


REVIEW OF SYSTEMS:


A comprehensive 10 point review of systems is otherwise negative aside from 

elements mentioned in the history of present illness.








Exam   


Constitutional   appears well nontoxic, triage nursing summary reviewed, vital 

signs reviewed, awake/alert. 


Eyes   normal conjunctivae and sclera, EOMI, PERRLA. 


HENT   normal inspection, atraumatic, moist mucus membranes, no epistaxis, neck 

supple/ no meningismus, no raccoon eyes. 


Respiratory   clear to auscultation bilaterally, normal breath sounds, no 

respiratory distress, no wheezing. 


Cardiovascular   rate normal, regular rhythm, no murmur, no edema, distal 

pulses normal. 


Gastrointestinal  urostomy bag located right lower abdomen, waking up the side 

of the bed, otherwise soft, non-tender, no rebound, no guarding, normal bowel 

sounds, no distension, no pulsatile mass. 


Genitourinary   no CVA tenderness. 


Musculoskeletal  no midline vertebral tenderness, full range of motion, no calf 

swelling, no tenderness of extremities, no meningismus, good pulses, 

neurovascularly intact.


Skin   pink, warm, & dry, no rash, skin atraumatic. 


Neurologic   awake, alert and oriented x 3, AAOx3, moves all 4 extremities 

equally, motor intact, sensory intact, CN II-XII intact, normal cerebellar, 

normal vision, normal speech. 


Psychiatric   normal mood/affect. 


Heme/Lymph/Immune   no lymphadenopathy.





Differential Diagnosis:  Includes but is not limited to in a particular order 

urostomy bag leaking, need for urostomy bag change,





Medical Decision Making:  Plan for this patient he is leaking urostomy bag 

other lateral aspect of the back.  Will replace his bag.





Re-evaluation:











Source: Patient, EMS





- Personal History


Tetanus Vaccine Date: 2015





- Medical/Surgical History


Hx Asthma: No


Hx Chronic Respiratory Disease: No


Hx Diabetes: No


Hx Cardiac Disease: No


Hx Renal Disease: No


Hx Cirrhosis: No


Hx Alcoholism: No


Hx HIV/AIDS: No


Hx Splenectomy or Spleen Trauma: No


Other PMH: hernia surg. bilateral knee surgeries, left hip surgery, appy, 

bladder CA w nephrostomy & urostomy





- Social History


Smoking Status: Current some day smoker


Allergies/Adverse Reactions: 


 





No Known Allergies Allergy (Verified 08/21/17 15:03)


 








Home Medications: 














 Medication  Instructions  Recorded


 


Acetaminophen [Tylenol 325mg (*)] 650 mg PO Q6 PRN 06/03/17


 


Loperamide HCl [Loperamide] 2 mg PO DAILY PRN 06/03/17


 


Aspirin EC [Aspirin EC 81 mg (*)] 81 mg PO DAILY  tab 06/08/17


 


Sennosides/Docusate Sodium 1 - 2 tab PO BID  tab 06/08/17





[Senokot-S]  


 


Acyclovir [Zovirax 400 mg (*)] 800 mg PO 5XD #30 tab 06/11/17


 


Norco  Tablet  06/17/17


 


Prochlorperazine Maleate  06/17/17


 


Cephalexin [Keflex (*)] 500 mg PO Q6 7 Days  cap 08/21/17














Departure





- Departure


Disposition: Home, Routine, Self-Care


Clinical Impression: 


 Attention to urostomy





Condition: Good


Instructions:  Urostomy Care (ED)


Additional Instructions: 


1. Return emergency room if develops worsening symptoms questions or concerns.

## 2017-10-31 NOTE — EDPHY
H & P


HPI/ROS: 





HPI





CHIEF COMPLAINT:  Urostomy bag leaking.





HISTORY OF PRESENT ILLNESS:  This patient very pleasant 67-year-old male 

otherwise healthy, does have significant past medical history for bladder 

cancer status post bladder removal and urostomy, AFib, deconditioning, presents 

to the emergency room by EMS from a local shelter.  He presents due to his 

urostomy bag leaking.  He denies any fever vomiting chest pain shortness of 

breath or significant abdominal pain.





Past Medical History:  AFib, proximal, bladder cancer, deconditioning





Past Surgical History:  Bladder removal, urostomy





Social History:  Denies daily use drugs alcohol tobacco products.  Resides at a 

local [facility], homeless.





Family History:  Noncontributory








ROS   


REVIEW OF SYSTEMS:


A comprehensive 10 point review of systems is otherwise negative aside from 

elements mentioned in the history of present illness.








Exam   


Constitutional   appears well nontoxic, triage nursing summary reviewed, vital 

signs reviewed, awake/alert. 


Eyes   normal conjunctivae and sclera, EOMI, PERRLA. 


HENT   normal inspection, atraumatic, moist mucus membranes, no epistaxis, neck 

supple/ no meningismus, no raccoon eyes. 


Respiratory   clear to auscultation bilaterally, normal breath sounds, no 

respiratory distress, no wheezing. 


Cardiovascular   rate normal, regular rhythm, no murmur, no edema, distal 

pulses normal. 


Gastrointestinal  urostomy bag located right lower abdomen, waking up the side 

of the bed, otherwise soft, non-tender, no rebound, no guarding, normal bowel 

sounds, no distension, no pulsatile mass. 


Genitourinary   no CVA tenderness. 


Musculoskeletal  no midline vertebral tenderness, full range of motion, no calf 

swelling, no tenderness of extremities, no meningismus, good pulses, 

neurovascularly intact.


Skin   pink, warm, & dry, no rash, skin atraumatic. 


Neurologic   awake, alert and oriented x 3, AAOx3, moves all 4 extremities 

equally, motor intact, sensory intact, CN II-XII intact, normal cerebellar, 

normal vision, normal speech. 


Psychiatric   normal mood/affect. 


Heme/Lymph/Immune   no lymphadenopathy.





Differential Diagnosis:  Includes but is not limited to in a particular order 

urostomy bag leaking, need for urostomy bag change,





Medical Decision Making:  Plan for this patient he is leaking urostomy bag 

other lateral aspect of the back.  Will replace his bag.





Re-evaluation:











Source: Patient, EMS





- Personal History


Tetanus Vaccine Date: 2015





- Medical/Surgical History


Hx Asthma: No


Hx Chronic Respiratory Disease: No


Hx Diabetes: No


Hx Cardiac Disease: No


Hx Renal Disease: No


Hx Cirrhosis: No


Hx Alcoholism: No


Hx HIV/AIDS: No


Hx Splenectomy or Spleen Trauma: No


Other PMH: hernia surg. bilateral knee surgeries, left hip surgery, appy, 

bladder CA w nephrostomy & urostomy





- Social History


Smoking Status: Current some day smoker


Allergies/Adverse Reactions: 


 





No Known Allergies Allergy (Verified 08/21/17 15:03)


 








Home Medications: 














 Medication  Instructions  Recorded


 


Acetaminophen [Tylenol 325mg (*)] 650 mg PO Q6 PRN 06/03/17


 


Loperamide HCl [Loperamide] 2 mg PO DAILY PRN 06/03/17


 


Aspirin EC [Aspirin EC 81 mg (*)] 81 mg PO DAILY  tab 06/08/17


 


Sennosides/Docusate Sodium 1 - 2 tab PO BID  tab 06/08/17





[Senokot-S]  


 


Acyclovir [Zovirax 400 mg (*)] 800 mg PO 5XD #30 tab 06/11/17


 


Norco  Tablet  06/17/17


 


Prochlorperazine Maleate  06/17/17


 


Cephalexin [Keflex (*)] 500 mg PO Q6 7 Days  cap 08/21/17














Departure





- Departure


Disposition: Home, Routine, Self-Care


Clinical Impression: 


 Attention to urostomy





Condition: Good


Instructions:  Urostomy Care (ED)


Additional Instructions: 


1. Return emergency room if develops worsening symptoms questions or concerns.

## 2017-11-01 NOTE — EDPHY
H & P


Stated Complaint: rectal bleeding


Time Seen by Provider: 11/01/17 07:47


HPI/ROS: 





CHIEF COMPLAINT:  Questionable rectal bleeding





HISTORY OF PRESENT ILLNESS:  The patient presents to the ED with questionable 

hematochezia.  The patient is currently homeless living in a shelter.  He has a 

history of bladder cancer and is status post resection with creation of an 

ileal conduit.  The patient has been seen in the emergency department a few 

times recently with leaking from his urostomy bag.  The patient was last seen 

yesterday.  The patient denies any acute abdominal pain.  The patient denies 

fever, vomiting, chills or back pain.  The patient denies anticoagulant use.  

The patient does have a history of atrial fibrillation.  The patient reports a 

remote history of hemorrhoids.





REVIEW OF SYSTEMS:


A comprehensive 10 point review of systems is otherwise negative aside from 

elements mentioned in the history of present illness.


Source: Patient


Exam Limitations: No limitations





- Personal History


Current Tetanus/Diphtheria Vaccine: Yes


Current Tetanus Diphtheria and Acellular Pertussis (TDAP): Yes


Tetanus Vaccine Date: 2015





- Medical/Surgical History


Hx Asthma: No


Hx Chronic Respiratory Disease: No


Hx Diabetes: No


Hx Cardiac Disease: No


Hx Renal Disease: No


Hx Cirrhosis: No


Hx Alcoholism: No


Hx HIV/AIDS: No


Hx Splenectomy or Spleen Trauma: No


Other PMH: hernia surg. bilateral knee surgeries, left hip surgery, appy, 

bladder CA w nephrostomy & urostomy





- Social History


Smoking Status: Current some day smoker





- Physical Exam


Exam: 





General Appearance:  Obese male, no acute distress


Eyes:  Pupils equal and round no pallor or injection


ENT, Mouth:  Mucous membranes moist


Respiratory:  There are no retractions, lungs are clear to auscultation


Cardiovascular:  Regular rate and rhythm


Gastrointestinal:  Abdomen is soft and nontender, no masses, bowel sounds normal

, ileal conduit stoma appears pink and well perfused.  Kimberly colored urine in 

urostomy bag


Neurological:  A&O, normal motor function, normal sensory exam, normal cranial 

nerves


Rectal:  Brown stool present, no active bleeding noted


Skin:  Warm and dry, no rashes


Musculoskeletal:  Neck is supple nontender


Extremities:  symmetrical, full range of motion








Constitutional: 


 Initial Vital Signs











Temperature (C)  36.8 C   11/01/17 07:48


 


Heart Rate  101 H  11/01/17 07:48


 


Respiratory Rate  18   11/01/17 07:48


 


Blood Pressure  140/90 H  11/01/17 07:48


 


O2 Sat (%)  91 L  11/01/17 07:48








 











O2 Delivery Mode               Room Air














Allergies/Adverse Reactions: 


 





No Known Allergies Allergy (Verified 10/31/17 22:36)


 








Home Medications: 














 Medication  Instructions  Recorded


 


[place]  Tablet  06/17/17














Medical Decision Making


ED Course/Re-evaluation: 





I reviewed the patient's past medical records including his most recent ED 

visits from yesterday and mid August.  The patient has no evidence of active 

rectal bleeding or lower GI bleeding on his initial exam.  He is 

hemodynamically stable.





The patient's physical exam demonstrates no evidence of GI bleeding.  His 

hematocrit is stable.  His Hemoccult is negative.





The patient was observed in the emergency department for 2 hours without 

evidence of any obvious bleeding.  He has a benign abdominal examination.  At 

this point time I do feel he can be discharged from the emergency department.


Differential Diagnosis: 





Differential diagnosis considered includes upper GI bleed, lower GI bleed, 

bleeding external hemorrhoid, hypovolemia





- Data Points


Laboratory Results: 


 Laboratory Results





 11/01/17 07:30 





 11/01/17 07:30 





 











  11/01/17 11/01/17 11/01/17





  07:30 07:30 07:30


 


WBC      8.42 10^3/uL 10^3/uL





     (3.80-9.50) 


 


RBC      4.65 10^6/uL 10^6/uL





     (4.40-6.38) 


 


Hgb      14.1 g/dL g/dL





     (13.7-17.5) 


 


Hct      43.2 % %





     (40.0-51.0) 


 


MCV      92.9 fL fL





     (81.5-99.8) 


 


MCH      30.3 pg pg





     (27.9-34.1) 


 


MCHC      32.6 g/dL g/dL





     (32.4-36.7) 


 


RDW      16.1 % H %





     (11.5-15.2) 


 


Plt Count      278 10^3/uL 10^3/uL





     (150-400) 


 


MPV      9.5 fL fL





     (8.7-11.7) 


 


Neut % (Auto)      68.0 % %





     (39.3-74.2) 


 


Lymph % (Auto)      19.6 % %





     (15.0-45.0) 


 


Mono % (Auto)      10.2 % %





     (4.5-13.0) 


 


Eos % (Auto)      1.1 % %





     (0.6-7.6) 


 


Baso % (Auto)      0.7 % %





     (0.3-1.7) 


 


Nucleat RBC Rel Count      0.0 % %





     (0.0-0.2) 


 


Absolute Neuts (auto)      5.73 10^3/uL 10^3/uL





     (1.70-6.50) 


 


Absolute Lymphs (auto)      1.65 10^3/uL 10^3/uL





     (1.00-3.00) 


 


Absolute Monos (auto)      0.86 10^3/uL H 10^3/uL





     (0.30-0.80) 


 


Absolute Eos (auto)      0.09 10^3/uL 10^3/uL





     (0.03-0.40) 


 


Absolute Basos (auto)      0.06 10^3/uL 10^3/uL





     (0.02-0.10) 


 


Absolute Nucleated RBC      0.00 10^3/uL 10^3/uL





     (0-0.01) 


 


Immature Gran %      0.4 % %





     (0.0-1.1) 


 


Immature Gran #      0.03 10^3/uL 10^3/uL





     (0.00-0.10) 


 


Sodium    147 mEq/L H mEq/L  





    (134-144)  


 


Potassium    3.5 mEq/L mEq/L  





    (3.5-5.2)  


 


Chloride    105 mEq/L mEq/L  





    ()  


 


Carbon Dioxide    26 mEq/l mEq/l  





    (22-31)  


 


Anion Gap    16 mEq/L mEq/L  





    (8-16)  


 


BUN    31 mg/dL H mg/dL  





    (7-23)  


 


Creatinine    1.1 mg/dL mg/dL  





    (0.7-1.3)  


 


Estimated GFR    > 60   





    


 


Glucose    97 mg/dL mg/dL  





    ()  


 


Calcium    9.5 mg/dL mg/dL  





    (8.5-10.4)  


 


Stool Occult Bld Scrn  NEGATIVE     





   (NEGATIVE)   














Departure





- Departure


Disposition: Home, Routine, Self-Care


Clinical Impression: 


 History of bladder cancer





Condition: Good


Instructions:  Abdominal Pain (ED)


Additional Instructions: 


1.  There is no evidence of bleeding noted on your exam or laboratory testing 

today.


2. Please follow up with your primary care provider as scheduled.


3. Please return to the emergency department for the development of severe pain

, recurrent bleeding or other concerns.


Referrals: 


[facility],. [Clinic] - As per Instructions
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
## 2017-11-01 NOTE — EDPHY
H & P


Stated Complaint: rectal bleeding


Time Seen by Provider: 11/01/17 07:47


HPI/ROS: 





CHIEF COMPLAINT:  Questionable rectal bleeding





HISTORY OF PRESENT ILLNESS:  The patient presents to the ED with questionable 

hematochezia.  The patient is currently homeless living in a shelter.  He has a 

history of bladder cancer and is status post resection with creation of an 

ileal conduit.  The patient has been seen in the emergency department a few 

times recently with leaking from his urostomy bag.  The patient was last seen 

yesterday.  The patient denies any acute abdominal pain.  The patient denies 

fever, vomiting, chills or back pain.  The patient denies anticoagulant use.  

The patient does have a history of atrial fibrillation.  The patient reports a 

remote history of hemorrhoids.





REVIEW OF SYSTEMS:


A comprehensive 10 point review of systems is otherwise negative aside from 

elements mentioned in the history of present illness.


Source: Patient


Exam Limitations: No limitations





- Personal History


Current Tetanus/Diphtheria Vaccine: Yes


Current Tetanus Diphtheria and Acellular Pertussis (TDAP): Yes


Tetanus Vaccine Date: 2015





- Medical/Surgical History


Hx Asthma: No


Hx Chronic Respiratory Disease: No


Hx Diabetes: No


Hx Cardiac Disease: No


Hx Renal Disease: No


Hx Cirrhosis: No


Hx Alcoholism: No


Hx HIV/AIDS: No


Hx Splenectomy or Spleen Trauma: No


Other PMH: hernia surg. bilateral knee surgeries, left hip surgery, appy, 

bladder CA w nephrostomy & urostomy





- Social History


Smoking Status: Current some day smoker





- Physical Exam


Exam: 





General Appearance:  Obese male, no acute distress


Eyes:  Pupils equal and round no pallor or injection


ENT, Mouth:  Mucous membranes moist


Respiratory:  There are no retractions, lungs are clear to auscultation


Cardiovascular:  Regular rate and rhythm


Gastrointestinal:  Abdomen is soft and nontender, no masses, bowel sounds normal

, ileal conduit stoma appears pink and well perfused.  Kimberly colored urine in 

urostomy bag


Neurological:  A&O, normal motor function, normal sensory exam, normal cranial 

nerves


Rectal:  Brown stool present, no active bleeding noted


Skin:  Warm and dry, no rashes


Musculoskeletal:  Neck is supple nontender


Extremities:  symmetrical, full range of motion








Constitutional: 


 Initial Vital Signs











Temperature (C)  36.8 C   11/01/17 07:48


 


Heart Rate  101 H  11/01/17 07:48


 


Respiratory Rate  18   11/01/17 07:48


 


Blood Pressure  140/90 H  11/01/17 07:48


 


O2 Sat (%)  91 L  11/01/17 07:48








 











O2 Delivery Mode               Room Air














Allergies/Adverse Reactions: 


 





No Known Allergies Allergy (Verified 10/31/17 22:36)


 








Home Medications: 














 Medication  Instructions  Recorded


 


[place]  Tablet  06/17/17














Medical Decision Making


ED Course/Re-evaluation: 





I reviewed the patient's past medical records including his most recent ED 

visits from yesterday and mid August.  The patient has no evidence of active 

rectal bleeding or lower GI bleeding on his initial exam.  He is 

hemodynamically stable.





The patient's physical exam demonstrates no evidence of GI bleeding.  His 

hematocrit is stable.  His Hemoccult is negative.





The patient was observed in the emergency department for 2 hours without 

evidence of any obvious bleeding.  He has a benign abdominal examination.  At 

this point time I do feel he can be discharged from the emergency department.


Differential Diagnosis: 





Differential diagnosis considered includes upper GI bleed, lower GI bleed, 

bleeding external hemorrhoid, hypovolemia





- Data Points


Laboratory Results: 


 Laboratory Results





 11/01/17 07:30 





 11/01/17 07:30 





 











  11/01/17 11/01/17 11/01/17





  07:30 07:30 07:30


 


WBC      8.42 10^3/uL 10^3/uL





     (3.80-9.50) 


 


RBC      4.65 10^6/uL 10^6/uL





     (4.40-6.38) 


 


Hgb      14.1 g/dL g/dL





     (13.7-17.5) 


 


Hct      43.2 % %





     (40.0-51.0) 


 


MCV      92.9 fL fL





     (81.5-99.8) 


 


MCH      30.3 pg pg





     (27.9-34.1) 


 


MCHC      32.6 g/dL g/dL





     (32.4-36.7) 


 


RDW      16.1 % H %





     (11.5-15.2) 


 


Plt Count      278 10^3/uL 10^3/uL





     (150-400) 


 


MPV      9.5 fL fL





     (8.7-11.7) 


 


Neut % (Auto)      68.0 % %





     (39.3-74.2) 


 


Lymph % (Auto)      19.6 % %





     (15.0-45.0) 


 


Mono % (Auto)      10.2 % %





     (4.5-13.0) 


 


Eos % (Auto)      1.1 % %





     (0.6-7.6) 


 


Baso % (Auto)      0.7 % %





     (0.3-1.7) 


 


Nucleat RBC Rel Count      0.0 % %





     (0.0-0.2) 


 


Absolute Neuts (auto)      5.73 10^3/uL 10^3/uL





     (1.70-6.50) 


 


Absolute Lymphs (auto)      1.65 10^3/uL 10^3/uL





     (1.00-3.00) 


 


Absolute Monos (auto)      0.86 10^3/uL H 10^3/uL





     (0.30-0.80) 


 


Absolute Eos (auto)      0.09 10^3/uL 10^3/uL





     (0.03-0.40) 


 


Absolute Basos (auto)      0.06 10^3/uL 10^3/uL





     (0.02-0.10) 


 


Absolute Nucleated RBC      0.00 10^3/uL 10^3/uL





     (0-0.01) 


 


Immature Gran %      0.4 % %





     (0.0-1.1) 


 


Immature Gran #      0.03 10^3/uL 10^3/uL





     (0.00-0.10) 


 


Sodium    147 mEq/L H mEq/L  





    (134-144)  


 


Potassium    3.5 mEq/L mEq/L  





    (3.5-5.2)  


 


Chloride    105 mEq/L mEq/L  





    ()  


 


Carbon Dioxide    26 mEq/l mEq/l  





    (22-31)  


 


Anion Gap    16 mEq/L mEq/L  





    (8-16)  


 


BUN    31 mg/dL H mg/dL  





    (7-23)  


 


Creatinine    1.1 mg/dL mg/dL  





    (0.7-1.3)  


 


Estimated GFR    > 60   





    


 


Glucose    97 mg/dL mg/dL  





    ()  


 


Calcium    9.5 mg/dL mg/dL  





    (8.5-10.4)  


 


Stool Occult Bld Scrn  NEGATIVE     





   (NEGATIVE)   














Departure





- Departure


Disposition: Home, Routine, Self-Care


Clinical Impression: 


 History of bladder cancer





Condition: Good


Instructions:  Abdominal Pain (ED)


Additional Instructions: 


1.  There is no evidence of bleeding noted on your exam or laboratory testing 

today.


2. Please follow up with your primary care provider as scheduled.


3. Please return to the emergency department for the development of severe pain

, recurrent bleeding or other concerns.


Referrals: 


[facility],. [Clinic] - As per Instructions
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
## 2017-11-01 NOTE — EDPHY
H & P


Stated Complaint: rectal bleeding


Time Seen by Provider: 11/01/17 07:47


HPI/ROS: 





CHIEF COMPLAINT:  Questionable rectal bleeding





HISTORY OF PRESENT ILLNESS:  The patient presents to the ED with questionable 

hematochezia.  The patient is currently homeless living in a shelter.  He has a 

history of bladder cancer and is status post resection with creation of an 

ileal conduit.  The patient has been seen in the emergency department a few 

times recently with leaking from his urostomy bag.  The patient was last seen 

yesterday.  The patient denies any acute abdominal pain.  The patient denies 

fever, vomiting, chills or back pain.  The patient denies anticoagulant use.  

The patient does have a history of atrial fibrillation.  The patient reports a 

remote history of hemorrhoids.





REVIEW OF SYSTEMS:


A comprehensive 10 point review of systems is otherwise negative aside from 

elements mentioned in the history of present illness.


Source: Patient


Exam Limitations: No limitations





- Personal History


Current Tetanus/Diphtheria Vaccine: Yes


Current Tetanus Diphtheria and Acellular Pertussis (TDAP): Yes


Tetanus Vaccine Date: 2015





- Medical/Surgical History


Hx Asthma: No


Hx Chronic Respiratory Disease: No


Hx Diabetes: No


Hx Cardiac Disease: No


Hx Renal Disease: No


Hx Cirrhosis: No


Hx Alcoholism: No


Hx HIV/AIDS: No


Hx Splenectomy or Spleen Trauma: No


Other PMH: hernia surg. bilateral knee surgeries, left hip surgery, appy, 

bladder CA w nephrostomy & urostomy





- Social History


Smoking Status: Current some day smoker





- Physical Exam


Exam: 





General Appearance:  Obese male, no acute distress


Eyes:  Pupils equal and round no pallor or injection


ENT, Mouth:  Mucous membranes moist


Respiratory:  There are no retractions, lungs are clear to auscultation


Cardiovascular:  Regular rate and rhythm


Gastrointestinal:  Abdomen is soft and nontender, no masses, bowel sounds normal

, ileal conduit stoma appears pink and well perfused.  Kimberly colored urine in 

urostomy bag


Neurological:  A&O, normal motor function, normal sensory exam, normal cranial 

nerves


Rectal:  Brown stool present, no active bleeding noted


Skin:  Warm and dry, no rashes


Musculoskeletal:  Neck is supple nontender


Extremities:  symmetrical, full range of motion








Constitutional: 


 Initial Vital Signs











Temperature (C)  36.8 C   11/01/17 07:48


 


Heart Rate  101 H  11/01/17 07:48


 


Respiratory Rate  18   11/01/17 07:48


 


Blood Pressure  140/90 H  11/01/17 07:48


 


O2 Sat (%)  91 L  11/01/17 07:48








 











O2 Delivery Mode               Room Air














Allergies/Adverse Reactions: 


 





No Known Allergies Allergy (Verified 10/31/17 22:36)


 








Home Medications: 














 Medication  Instructions  Recorded


 


[place]  Tablet  06/17/17














Medical Decision Making


ED Course/Re-evaluation: 





I reviewed the patient's past medical records including his most recent ED 

visits from yesterday and mid August.  The patient has no evidence of active 

rectal bleeding or lower GI bleeding on his initial exam.  He is 

hemodynamically stable.





The patient's physical exam demonstrates no evidence of GI bleeding.  His 

hematocrit is stable.  His Hemoccult is negative.





The patient was observed in the emergency department for 2 hours without 

evidence of any obvious bleeding.  He has a benign abdominal examination.  At 

this point time I do feel he can be discharged from the emergency department.


Differential Diagnosis: 





Differential diagnosis considered includes upper GI bleed, lower GI bleed, 

bleeding external hemorrhoid, hypovolemia





- Data Points


Laboratory Results: 


 Laboratory Results





 11/01/17 07:30 





 11/01/17 07:30 





 











  11/01/17 11/01/17 11/01/17





  07:30 07:30 07:30


 


WBC      8.42 10^3/uL 10^3/uL





     (3.80-9.50) 


 


RBC      4.65 10^6/uL 10^6/uL





     (4.40-6.38) 


 


Hgb      14.1 g/dL g/dL





     (13.7-17.5) 


 


Hct      43.2 % %





     (40.0-51.0) 


 


MCV      92.9 fL fL





     (81.5-99.8) 


 


MCH      30.3 pg pg





     (27.9-34.1) 


 


MCHC      32.6 g/dL g/dL





     (32.4-36.7) 


 


RDW      16.1 % H %





     (11.5-15.2) 


 


Plt Count      278 10^3/uL 10^3/uL





     (150-400) 


 


MPV      9.5 fL fL





     (8.7-11.7) 


 


Neut % (Auto)      68.0 % %





     (39.3-74.2) 


 


Lymph % (Auto)      19.6 % %





     (15.0-45.0) 


 


Mono % (Auto)      10.2 % %





     (4.5-13.0) 


 


Eos % (Auto)      1.1 % %





     (0.6-7.6) 


 


Baso % (Auto)      0.7 % %





     (0.3-1.7) 


 


Nucleat RBC Rel Count      0.0 % %





     (0.0-0.2) 


 


Absolute Neuts (auto)      5.73 10^3/uL 10^3/uL





     (1.70-6.50) 


 


Absolute Lymphs (auto)      1.65 10^3/uL 10^3/uL





     (1.00-3.00) 


 


Absolute Monos (auto)      0.86 10^3/uL H 10^3/uL





     (0.30-0.80) 


 


Absolute Eos (auto)      0.09 10^3/uL 10^3/uL





     (0.03-0.40) 


 


Absolute Basos (auto)      0.06 10^3/uL 10^3/uL





     (0.02-0.10) 


 


Absolute Nucleated RBC      0.00 10^3/uL 10^3/uL





     (0-0.01) 


 


Immature Gran %      0.4 % %





     (0.0-1.1) 


 


Immature Gran #      0.03 10^3/uL 10^3/uL





     (0.00-0.10) 


 


Sodium    147 mEq/L H mEq/L  





    (134-144)  


 


Potassium    3.5 mEq/L mEq/L  





    (3.5-5.2)  


 


Chloride    105 mEq/L mEq/L  





    ()  


 


Carbon Dioxide    26 mEq/l mEq/l  





    (22-31)  


 


Anion Gap    16 mEq/L mEq/L  





    (8-16)  


 


BUN    31 mg/dL H mg/dL  





    (7-23)  


 


Creatinine    1.1 mg/dL mg/dL  





    (0.7-1.3)  


 


Estimated GFR    > 60   





    


 


Glucose    97 mg/dL mg/dL  





    ()  


 


Calcium    9.5 mg/dL mg/dL  





    (8.5-10.4)  


 


Stool Occult Bld Scrn  NEGATIVE     





   (NEGATIVE)   














Departure





- Departure


Disposition: Home, Routine, Self-Care


Clinical Impression: 


 History of bladder cancer





Condition: Good


Instructions:  Abdominal Pain (ED)


Additional Instructions: 


1.  There is no evidence of bleeding noted on your exam or laboratory testing 

today.


2. Please follow up with your primary care provider as scheduled.


3. Please return to the emergency department for the development of severe pain

, recurrent bleeding or other concerns.


Referrals: 


[facility],. [Clinic] - As per Instructions
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
## 2017-11-03 NOTE — EDPHY
H & P


HPI/ROS: 





HPI





CHIEF COMPLAINT:  Urostomy bag leaking.





HISTORY OF PRESENT ILLNESS:  Patient very pleasant 67-year-old male, familiar 

to myself he was here recently in the emergency room for urostomy bag leaking.  

This bag was changed.  Presents back to the emergency room the homeless shelter 

with his bag leaking again.  Denies any pain.  Denies fever denies vomiting.  

Denies abdominal trauma.








Past Medical History:  Bladder cancer, urostomy.





Past Surgical History:  Bladder cancer resection, urostomy diversion





Social History:  Denies daily use drugs alcohol tobacco products.  Homeless.





Family History:  Noncontributory.








ROS   


REVIEW OF SYSTEMS:


A comprehensive 10 point review of systems is otherwise negative aside from 

elements mentioned in the history of present illness.








Exam   


Constitutional   triage nursing summary reviewed, vital signs reviewed, awake/

alert. 


Eyes   normal conjunctivae and sclera, EOMI, PERRLA. 


HENT   normal inspection, atraumatic, moist mucus membranes, no epistaxis, neck 

supple/ no meningismus, no raccoon eyes. 


Respiratory   clear to auscultation bilaterally, normal breath sounds, no 

respiratory distress, no wheezing. 


Cardiovascular   rate normal, regular rhythm, no murmur, no edema, distal 

pulses normal. 


Gastrointestinal right anterior abdominal wall has a urostomy site.  Back 

leaking out of the lateral edge.  soft, non-tender, no rebound, no guarding, 

normal bowel sounds, no distension, no pulsatile mass. 


Genitourinary   no CVA tenderness. 


Musculoskeletal  no midline vertebral tenderness, full range of motion, no calf 

swelling, no tenderness of extremities, no meningismus, good pulses, 

neurovascularly intact.


Skin   pink, warm, & dry, no rash, skin atraumatic. 


Neurologic   awake, alert and oriented x 3, AAOx3, moves all 4 extremities 

equally, motor intact, sensory intact, CN II-XII intact, normal cerebellar, 

normal vision, normal speech. 


Psychiatric   normal mood/affect. 


Heme/Lymph/Immune   no lymphadenopathy.





Differential Diagnosis:  Includes but is not limited to in a particular order, 

urostomy bag change, urostomy bag leaking, need for new urostomy bag.





Medical Decision Making:  Plan for this patient changes her ostomy bag.  

Reestablish to see on any can be discharged.











Urostomy changed. NO leaking. Okay for d/c.


Source: Patient, EMS





- Personal History


Tetanus Vaccine Date: 2015





- Medical/Surgical History


Hx Asthma: No


Hx Chronic Respiratory Disease: No


Hx Diabetes: No


Hx Cardiac Disease: No


Hx Renal Disease: No


Hx Cirrhosis: No


Hx Alcoholism: No


Hx HIV/AIDS: No


Hx Splenectomy or Spleen Trauma: No


Other PMH: hernia surg. bilateral knee surgeries, left hip surgery, appy, 

bladder CA w nephrostomy & urostomy





- Social History


Smoking Status: Current some day smoker


Constitutional: 


 Initial Vital Signs











Temperature (C)  36.7 C   11/03/17 00:35


 


Heart Rate  77   11/03/17 00:35


 


Respiratory Rate  18   11/03/17 00:35


 


Blood Pressure  126/86 H  11/03/17 00:35


 


O2 Sat (%)  92   11/03/17 00:35








 











O2 Delivery Mode               Room Air














Allergies/Adverse Reactions: 


 





No Known Allergies Allergy (Verified 10/31/17 22:36)


 








Home Medications: 














 Medication  Instructions  Recorded


 


[place]  Tablet  06/17/17


 


Cymbalta  11/03/17














Departure





- Departure


Disposition: Home, Routine, Self-Care


Clinical Impression: 


 Complication of urostomy





Condition: Good


Instructions:  Urostomy Care (ED)

## 2017-11-03 NOTE — EDPHY
H & P


HPI/ROS: 





HPI





CHIEF COMPLAINT:  Urostomy bag leaking.





HISTORY OF PRESENT ILLNESS:  Patient very pleasant 67-year-old male, familiar 

to myself he was here recently in the emergency room for urostomy bag leaking.  

This bag was changed.  Presents back to the emergency room the homeless shelter 

with his bag leaking again.  Denies any pain.  Denies fever denies vomiting.  

Denies abdominal trauma.








Past Medical History:  Bladder cancer, urostomy.





Past Surgical History:  Bladder cancer resection, urostomy diversion





Social History:  Denies daily use drugs alcohol tobacco products.  Homeless.





Family History:  Noncontributory.








ROS   


REVIEW OF SYSTEMS:


A comprehensive 10 point review of systems is otherwise negative aside from 

elements mentioned in the history of present illness.








Exam   


Constitutional   triage nursing summary reviewed, vital signs reviewed, awake/

alert. 


Eyes   normal conjunctivae and sclera, EOMI, PERRLA. 


HENT   normal inspection, atraumatic, moist mucus membranes, no epistaxis, neck 

supple/ no meningismus, no raccoon eyes. 


Respiratory   clear to auscultation bilaterally, normal breath sounds, no 

respiratory distress, no wheezing. 


Cardiovascular   rate normal, regular rhythm, no murmur, no edema, distal 

pulses normal. 


Gastrointestinal right anterior abdominal wall has a urostomy site.  Back 

leaking out of the lateral edge.  soft, non-tender, no rebound, no guarding, 

normal bowel sounds, no distension, no pulsatile mass. 


Genitourinary   no CVA tenderness. 


Musculoskeletal  no midline vertebral tenderness, full range of motion, no calf 

swelling, no tenderness of extremities, no meningismus, good pulses, 

neurovascularly intact.


Skin   pink, warm, & dry, no rash, skin atraumatic. 


Neurologic   awake, alert and oriented x 3, AAOx3, moves all 4 extremities 

equally, motor intact, sensory intact, CN II-XII intact, normal cerebellar, 

normal vision, normal speech. 


Psychiatric   normal mood/affect. 


Heme/Lymph/Immune   no lymphadenopathy.





Differential Diagnosis:  Includes but is not limited to in a particular order, 

urostomy bag change, urostomy bag leaking, need for new urostomy bag.





Medical Decision Making:  Plan for this patient changes her ostomy bag.  

Reestablish to see on any can be discharged.











Urostomy changed. NO leaking. Okay for d/c.


Source: Patient, EMS





- Personal History


Tetanus Vaccine Date: 2015





- Medical/Surgical History


Hx Asthma: No


Hx Chronic Respiratory Disease: No


Hx Diabetes: No


Hx Cardiac Disease: No


Hx Renal Disease: No


Hx Cirrhosis: No


Hx Alcoholism: No


Hx HIV/AIDS: No


Hx Splenectomy or Spleen Trauma: No


Other PMH: hernia surg. bilateral knee surgeries, left hip surgery, appy, 

bladder CA w nephrostomy & urostomy





- Social History


Smoking Status: Current some day smoker


Constitutional: 


 Initial Vital Signs











Temperature (C)  36.7 C   11/03/17 00:35


 


Heart Rate  77   11/03/17 00:35


 


Respiratory Rate  18   11/03/17 00:35


 


Blood Pressure  126/86 H  11/03/17 00:35


 


O2 Sat (%)  92   11/03/17 00:35








 











O2 Delivery Mode               Room Air














Allergies/Adverse Reactions: 


 





No Known Allergies Allergy (Verified 10/31/17 22:36)


 








Home Medications: 














 Medication  Instructions  Recorded


 


[place]  Tablet  06/17/17


 


Cymbalta  11/03/17














Departure





- Departure


Disposition: Home, Routine, Self-Care


Clinical Impression: 


 Complication of urostomy





Condition: Good


Instructions:  Urostomy Care (ED)

## 2017-11-03 NOTE — EDPHY
H & P


HPI/ROS: 





HPI





CHIEF COMPLAINT:  Urostomy bag leaking.





HISTORY OF PRESENT ILLNESS:  Patient very pleasant 67-year-old male, familiar 

to myself he was here recently in the emergency room for urostomy bag leaking.  

This bag was changed.  Presents back to the emergency room the homeless shelter 

with his bag leaking again.  Denies any pain.  Denies fever denies vomiting.  

Denies abdominal trauma.








Past Medical History:  Bladder cancer, urostomy.





Past Surgical History:  Bladder cancer resection, urostomy diversion





Social History:  Denies daily use drugs alcohol tobacco products.  Homeless.





Family History:  Noncontributory.








ROS   


REVIEW OF SYSTEMS:


A comprehensive 10 point review of systems is otherwise negative aside from 

elements mentioned in the history of present illness.








Exam   


Constitutional   triage nursing summary reviewed, vital signs reviewed, awake/

alert. 


Eyes   normal conjunctivae and sclera, EOMI, PERRLA. 


HENT   normal inspection, atraumatic, moist mucus membranes, no epistaxis, neck 

supple/ no meningismus, no raccoon eyes. 


Respiratory   clear to auscultation bilaterally, normal breath sounds, no 

respiratory distress, no wheezing. 


Cardiovascular   rate normal, regular rhythm, no murmur, no edema, distal 

pulses normal. 


Gastrointestinal right anterior abdominal wall has a urostomy site.  Back 

leaking out of the lateral edge.  soft, non-tender, no rebound, no guarding, 

normal bowel sounds, no distension, no pulsatile mass. 


Genitourinary   no CVA tenderness. 


Musculoskeletal  no midline vertebral tenderness, full range of motion, no calf 

swelling, no tenderness of extremities, no meningismus, good pulses, 

neurovascularly intact.


Skin   pink, warm, & dry, no rash, skin atraumatic. 


Neurologic   awake, alert and oriented x 3, AAOx3, moves all 4 extremities 

equally, motor intact, sensory intact, CN II-XII intact, normal cerebellar, 

normal vision, normal speech. 


Psychiatric   normal mood/affect. 


Heme/Lymph/Immune   no lymphadenopathy.





Differential Diagnosis:  Includes but is not limited to in a particular order, 

urostomy bag change, urostomy bag leaking, need for new urostomy bag.





Medical Decision Making:  Plan for this patient changes her ostomy bag.  

Reestablish to see on any can be discharged.











Urostomy changed. NO leaking. Okay for d/c.


Source: Patient, EMS





- Personal History


Tetanus Vaccine Date: 2015





- Medical/Surgical History


Hx Asthma: No


Hx Chronic Respiratory Disease: No


Hx Diabetes: No


Hx Cardiac Disease: No


Hx Renal Disease: No


Hx Cirrhosis: No


Hx Alcoholism: No


Hx HIV/AIDS: No


Hx Splenectomy or Spleen Trauma: No


Other PMH: hernia surg. bilateral knee surgeries, left hip surgery, appy, 

bladder CA w nephrostomy & urostomy





- Social History


Smoking Status: Current some day smoker


Constitutional: 


 Initial Vital Signs











Temperature (C)  36.7 C   11/03/17 00:35


 


Heart Rate  77   11/03/17 00:35


 


Respiratory Rate  18   11/03/17 00:35


 


Blood Pressure  126/86 H  11/03/17 00:35


 


O2 Sat (%)  92   11/03/17 00:35








 











O2 Delivery Mode               Room Air














Allergies/Adverse Reactions: 


 





No Known Allergies Allergy (Verified 10/31/17 22:36)


 








Home Medications: 














 Medication  Instructions  Recorded


 


[place]  Tablet  06/17/17


 


Cymbalta  11/03/17














Departure





- Departure


Disposition: Home, Routine, Self-Care


Clinical Impression: 


 Complication of urostomy





Condition: Good


Instructions:  Urostomy Care (ED)

## 2017-11-08 NOTE — EDPHY
H & P


HPI/ROS: 





HPI





CHIEF COMPLAINT:  Urostomy bag leaking.





HISTORY OF PRESENT ILLNESS:  This patient very pleasant 67-year-old male, well 

known to myself as well as the emergency room I seen him over the past few 

weeks with the urostomy bag leaking.  He has a urostomy site located right 

lower quadrant abdomen from a ureteral diversion due to bladder cancer.  He is 

homeless staying at a shelter.  According to the patient they have ordered 

urostomy bags for him at the shelter they have not come in.  This is the 3rd 

time in the emergency room to have this changed.  No other complaints.





Past Medical History:  Bladder cancer





Past Surgical History:  Bladder resection and urostomy diversion





Social History:  Homeless, denies daily use of drugs alcohol tobacco.





Family History:  Noncontributory








ROS   


REVIEW OF SYSTEMS:


A comprehensive 10 point review of systems is otherwise negative aside from 

elements mentioned in the history of present illness.








Exam   


Constitutional  appears well nontoxic, triage nursing summary reviewed, vital 

signs reviewed, awake/alert. 


Eyes   normal conjunctivae and sclera, EOMI, PERRLA. 


HENT   normal inspection, atraumatic, moist mucus membranes, no epistaxis, neck 

supple/ no meningismus, no raccoon eyes. 


Respiratory   clear to auscultation bilaterally, normal breath sounds, no 

respiratory distress, no wheezing. 


Cardiovascular   rate normal, regular rhythm, no murmur, no edema, distal 

pulses normal. 


Gastrointestinal  right lower quadrant urostomy diversion bag leaking.  

Otherwise soft nontender no signs of infection, soft, non-tender, no rebound, 

no guarding, normal bowel sounds, no distension, no pulsatile mass. 


Genitourinary   no CVA tenderness. 


Musculoskeletal  no midline vertebral tenderness, full range of motion, no calf 

swelling, no tenderness of extremities, no meningismus, good pulses, 

neurovascularly intact.


Skin   pink, warm, & dry, no rash, skin atraumatic. 


Neurologic   awake, alert and oriented x 3, AAOx3, moves all 4 extremities 

equally, motor intact, sensory intact, CN II-XII intact, normal cerebellar, 

normal vision, normal speech. 


Psychiatric   normal mood/affect. 


Heme/Lymph/Immune   no lymphadenopathy.





Differential Diagnosis:  Includes but is not limited to:  Need for urostomy bag 

change.  Need for new urostomy bag





Medical Decision Making:  Plan for this patient changes urostomy.  And then he 

can be discharged back to his shelter.  His urostomy site looks clean, not 

infected no wound dehiscence.  Bag leaking and needs a new bag.

















Source: Patient, EMS





- Personal History


Tetanus Vaccine Date: 2015





- Medical/Surgical History


Hx Asthma: No


Hx Chronic Respiratory Disease: No


Hx Diabetes: No


Hx Cardiac Disease: No


Hx Renal Disease: No


Hx Cirrhosis: No


Hx Alcoholism: No


Hx HIV/AIDS: No


Hx Splenectomy or Spleen Trauma: No


Other PMH: hernia surg. bilateral knee surgeries, left hip surgery, appy, 

bladder CA w nephrostomy & urostomy





- Social History


Smoking Status: Current some day smoker


Allergies/Adverse Reactions: 


 





No Known Allergies Allergy (Verified 10/31/17 22:36)


 








Home Medications: 














 Medication  Instructions  Recorded


 


[place]  Tablet  06/17/17


 


Cymbalta  11/03/17














Departure





- Departure


Disposition: Home, Routine, Self-Care


Clinical Impression: 


 Complication of urostomy





Condition: Good


Instructions:  Urostomy Care (ED)


Referrals: 


Patient,NotPresent [Primary Care Provider] - As per Instructions

## 2017-11-15 NOTE — EDPHY
H & P


Stated Complaint: leaking ostomy bag


HPI/ROS: 





HPI





CHIEF COMPLAINT:  Ostomy leaking.


HISTORY OF PRESENT ILLNESS:  Patient is a 67-year-old male homeless, very 

familiar to myself as a seen multiple times for the same issue.  He has a 

urostomy located in the right lower quadrant.  This is status post bladder 

cancer.  He presents emergency room for urostomy bag change as the wafer is 

leaking at the lateral aspect.





 





Past Medical History:  Bladder cancer





Past Surgical History:  Bladder resection , ureteral diversion





Social History:  Denies daily use drugs alcohol tobacco products.  Homeless.  

Resides at a local shelter.





Family History:  Noncontributory








ROS   


REVIEW OF SYSTEMS:


A comprehensive 10 point review of systems is otherwise negative aside from 

elements mentioned in the history of present illness.








Exam   


Constitutional   triage nursing summary reviewed, vital signs reviewed, awake/

alert. 


Eyes   normal conjunctivae and sclera, EOMI, PERRLA. 


HENT   normal inspection, atraumatic, moist mucus membranes, no epistaxis, neck 

supple/ no meningismus, no raccoon eyes. 


Respiratory   clear to auscultation bilaterally, normal breath sounds, no 

respiratory distress, no wheezing. 


Cardiovascular   rate normal, regular rhythm, no murmur, no edema, distal 

pulses normal. 


Gastrointestinal  abdomen is nontender right lower quadrant urostomy region.  

Not infected.  No significant redness.  No wound dehiscence.  No significant 

protrusion of the ostomy, need for new ostomy bag  soft, non-tender, no rebound

, no guarding, normal bowel sounds, no distension, no pulsatile mass. 


Genitourinary   no CVA tenderness. 


Musculoskeletal  no midline vertebral tenderness, full range of motion, no calf 

swelling, no tenderness of extremities, no meningismus, good pulses, 

neurovascularly intact.


Skin   pink, warm, & dry, no rash, skin atraumatic. 


Neurologic   awake, alert and oriented x 3, AAOx3, moves all 4 extremities 

equally, motor intact, sensory intact, CN II-XII intact, normal cerebellar, 

normal vision, normal speech. 


Psychiatric   normal mood/affect. 


Heme/Lymph/Immune   no lymphadenopathy.





Differential Diagnosis:  Need for new ostomy bag, ostomy bag change, need for 

new ostomy





Medical Decision Making:  Plan for this patient change ostomy bag.

















Source: Patient





- Personal History


Current Tetanus/Diphtheria Vaccine: Yes


Current Tetanus Diphtheria and Acellular Pertussis (TDAP): Yes


Tetanus Vaccine Date: 2015





- Medical/Surgical History


Hx Asthma: No


Hx Chronic Respiratory Disease: No


Hx Diabetes: No


Hx Cardiac Disease: No


Hx Renal Disease: Yes


Hx Cirrhosis: No


Hx Alcoholism: No


Hx HIV/AIDS: No


Hx Splenectomy or Spleen Trauma: No


Other PMH: hernia surg. bilateral knee surgeries, left hip surgery, appy, 

bladder CA w nephrostomy & urostomy,





- Social History


Smoking Status: Current some day smoker


Constitutional: 





 Initial Vital Signs











Temperature (C)  36.6 C   11/15/17 22:36


 


Heart Rate  83   11/15/17 22:36


 


Respiratory Rate  83 H  11/15/17 22:36


 


Blood Pressure  135/91 H  11/15/17 22:36


 


O2 Sat (%)  92   11/15/17 22:36








 











O2 Delivery Mode               Room Air














Allergies/Adverse Reactions: 


 





No Known Allergies Allergy (Verified 11/15/17 22:35)


 








Home Medications: 














 Medication  Instructions  Recorded


 


[place]  Tablet  06/17/17


 


Cymbalta  11/03/17














Departure





- Departure


Disposition: Home, Routine, Self-Care


Clinical Impression: 


 Attention to urostomy





Condition: Good


Instructions:  Urostomy Care (ED)


Additional Instructions: 


1. Return emergency room if he develops worsening symptoms.  I do recommend he 

get outpatient care for your urostomy.


Referrals: 


NONE *PRIMARY CARE P,. [Primary Care Provider] - As per Instructions

## 2017-11-16 NOTE — ASMTCMCOM
CM Note

 

CM Note                       

Notes:

Case Management received call today from Jerri at [facility] [phone] 

[phone] regarding care coordination for this patient who visited the ER last night.  Patient is well 

known to this ER and CM.  He recently established care at [facility] in [place] 

[phone].



Patient has visited this ER numerous times over the past several months, most recently related to 

issues with his ostomy tube.  Patient is currently homeless and had most recently been a resident 

at [facility] prior to being "asked to leave" earlier this month.  Prior to this, patient had an 


apartment at [facility] in [place] (program through [facility]



CM will do our best to assist this patient with resources and coordinate care with Chelle ([facility]) when he presents to the ER.  Patient should also be directed to follow up with the 


[facility] whenever he visits the ER

 

Date Signed:  11/16/2017 04:53 PM

Electronically Signed By:La Eller RN

## 2017-12-25 ENCOUNTER — HOSPITAL ENCOUNTER (EMERGENCY)
Dept: HOSPITAL 80 - FED | Age: 67
Discharge: HOME | End: 2017-12-25
Payer: COMMERCIAL

## 2017-12-25 VITALS
HEART RATE: 90 BPM | RESPIRATION RATE: 18 BRPM | OXYGEN SATURATION: 94 % | SYSTOLIC BLOOD PRESSURE: 152 MMHG | DIASTOLIC BLOOD PRESSURE: 100 MMHG | TEMPERATURE: 98.1 F

## 2017-12-25 DIAGNOSIS — F17.200: ICD-10-CM

## 2017-12-25 DIAGNOSIS — Z85.51: ICD-10-CM

## 2017-12-25 DIAGNOSIS — N99.522: Primary | ICD-10-CM

## 2017-12-25 NOTE — EDPHY
H & P


Time Seen by Provider: 12/25/17 18:30


HPI/ROS: 





CHIEF COMPLAINT:  Urostomy bag broke





HISTORY OF PRESENT ILLNESS:  The patient is a 67-year-old male status post 

bladder cancer and cysto prostatectomy who presents to the emergency department 

with his urostomy bag malfunctioning.  The patient went to change his bag and 

the disc pulled off the skin.  He does not have this piece of equipment at 

home.  He states his urostomy has been working well.  He has no complaints of 

abdominal pain.  No recent fevers or chills.





REVIEW OF SYSTEMS:  


My complete review of systems is negative except as mentioned in the HPI.


Past Medical/Surgical History: 





Includes bladder cancer, hydronephrosis, urinary tract infection, paroxysmal 

atrial fibrillation, herpes zoster, chronic hypoxemic respiratory failure





Past surgical history:  Includes nephrostomy, urostomy, cystoprostatectomy





The social history:  The patient smokes


Smoking Status: Current some day smoker


Physical Exam: 





GENERAL:  Well-appearing, in no acute distress, alert.


HEENT:  Eyes normal to inspection, no signs of dehydration.


RESPIRATORY:  Clear to auscultation bilaterally, no rales, rhonchi or wheezing.


CVS:  Regular rate and rhythm, no rubs, murmurs, or gallops.


ABDOMEN:  Soft, nontender, nondistended, no organomegaly. Patient has a broken 

urostomy bag.  There is urine drainage.


BACK:  Normal to inspection, no CVA tenderness.


SKIN:  Normal color, no rash, warm, dry.  No pallor.


EXTREMITIES:  No pedal edema, no calf tenderness, no Homans sign or cords, no 

joint swelling.


NEURO/PSYCH:  Alert and oriented, normal mood and affect, normal motor sensory 

exam.


Constitutional: 


 Initial Vital Signs











Temperature (C)  36.7 C   12/25/17 18:27


 


Heart Rate  90   12/25/17 18:27


 


Respiratory Rate  18   12/25/17 18:27


 


Blood Pressure  152/100 H  12/25/17 18:27


 


O2 Sat (%)  94   12/25/17 18:27








 











O2 Delivery Mode               Room Air














Allergies/Adverse Reactions: 


 





No Known Allergies Allergy (Verified 12/25/17 18:27)


 








Home Medications: 














 Medication  Instructions  Recorded


 


[place]  Tablet  06/17/17


 


Cymbalta  11/03/17














Medical Decision Making


ED Course/Re-evaluation: 





In the emergency department I discussed possible etiologies with the patient.  

I answered all his questions.  I obtained a new urostomy get from supply.  This 

was replaced.  The patient tolerated the transfer well.





Patient was given warnings prior to leaving.  He will return with worsening 

symptoms.


Differential Diagnosis: 





My differential includes but is not limited to urostomy bag malfunction, 

urinary tract infection, pyelonephritis





Departure





- Departure


Disposition: Home, Routine, Self-Care


Clinical Impression: 


 Encounter for counseling for urostomy management, Complication of urostomy





Condition: Good


Instructions:  Urostomy Care (ED)


Referrals: 


Jewel Martines MD [Medical Doctor] - 5-7 days, if not improved

## 2018-01-01 ENCOUNTER — HOSPITAL ENCOUNTER (EMERGENCY)
Dept: HOSPITAL 80 - FED | Age: 68
Discharge: HOME | End: 2018-01-01
Payer: COMMERCIAL

## 2018-01-01 VITALS
RESPIRATION RATE: 16 BRPM | OXYGEN SATURATION: 91 % | DIASTOLIC BLOOD PRESSURE: 93 MMHG | SYSTOLIC BLOOD PRESSURE: 144 MMHG | HEART RATE: 75 BPM

## 2018-01-01 VITALS — TEMPERATURE: 97.9 F

## 2018-01-01 DIAGNOSIS — Z87.891: ICD-10-CM
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
## 2018-01-01 DIAGNOSIS — Z85.51: ICD-10-CM

## 2018-01-01 DIAGNOSIS — T83.038A: Primary | ICD-10-CM

## 2018-01-01 NOTE — EDPHY
H & P


Stated Complaint: urostomy bag broke and is leaking


HPI/ROS: 





CHIEF COMPLAINT: "Urostomy bag leaking"





HISTORY OF PRESENT ILLNESS:


The patient is a 66 y/o male with a history of bladder cancer that required a 

nephrostomy and urostomy, complaining of his urostomy bag leaking. On 12/25/17, 

7 days ago, he presented to the ED after the disc that adheres the urostomy bag 

to his abdomen had fallen off. They were able to replace the disc during this 

visit and he was discharged home. Since this ED visit the urostomy has fallen 

off twice. He has mild abdominal pain. Denies bowel complaints, chest pain, 

shortness of breath, fever or other pertinent symptoms.





Prior medical records reviewed including ED visit with Dr. Grajeda on 12/25/17.





REVIEW OF SYSTEMS:





A ten point review of systems was performed and is negative with the exception 

of the items mentioned in the HPI.





Past medical history:


Bladder cancer with nephrostomy and urostomy





Past surgical history:


Appendectomy


Hernia surgery


Bilateral knee surgery


Left hip surgery





Family history:


Denies





Social history:


Transient


Single


Retired





General Appearance:  Alert.  Vital signs reviewed.  


Eyes:  Pupils equal and round, no conjunctival injection, no discharge. 

Anicteric.


ENT, Mouth:  Mucous membranes are moist, no oropharyngeal erythema or edema.


Neck:  No lymphadenopathy.


Respiratory:  Lungs are clear to auscultation; no wheezes, rales, or rhonchi.


Cardiovascular:  Regular rate and rhythm; no murmur, rub, or gallop.


Gastrointestinal: Urostomy in place. Disc that holds urostomy in place is not 

adherent to his skin. The stoma looks pink and good. Abdomen is soft and 

nontender, no masses or organomegaly, bowel sounds normal.


Skin:  Warm and dry, no rashes on exposed skin, normal color.


Back:  Nontender to palpation over the thoracolumbar spine. No CVAT.


Extremities:  No lower extremity edema, no calf tenderness or swelling.


Neurological:  Alert and oriented.  Moving all four extremities easily and 

equally.


Psychiatric:  Normal affect.





- Personal History


Current Tetanus/Diphtheria Vaccine: Yes


Tetanus Vaccine Date: 2015





- Medical/Surgical History


Hx Asthma: No


Hx Chronic Respiratory Disease: No


Hx Diabetes: No


Hx Cardiac Disease: No


Hx Renal Disease: Yes


Hx Cirrhosis: No


Hx Alcoholism: No


Hx HIV/AIDS: No


Hx Splenectomy or Spleen Trauma: No


Other PMH: hernia surg. bilateral knee surgeries, left hip surgery, appy, 

bladder CA w nephrostomy & urostomy,





- Social History


Smoking Status: Former smoker


Constitutional: 


 Initial Vital Signs











Temperature (C)  36.6 C   01/01/18 13:40


 


Heart Rate  86   01/01/18 13:40


 


Respiratory Rate  18   01/01/18 13:40


 


Blood Pressure  149/108 H  01/01/18 13:40


 


O2 Sat (%)  94   01/01/18 13:40








 











O2 Delivery Mode               Room Air














Allergies/Adverse Reactions: 


 





No Known Allergies Allergy (Verified 01/01/18 13:39)


 








Home Medications: 














 Medication  Instructions  Recorded


 


Bedford  Tablet  06/17/17


 


Cymbalta  11/03/17














Medical Decision Making


ED Course/Re-evaluation: 


The patient is a 66 y/o male with a history of bladder cancer that required a 

nephrostomy and urostomy, presenting with a urostomy that is leaking. On 12/25/ 17, he was seen in the ED for similar symptoms. On exam the disc that holds the 

urostomy in place adherent. The stoma looks pink and good. 





1452: Reassessed patient. His urostomy bag has been replaced by nursing staff 

and appears to be strongly adherent. I have advised him to follow up with Dr. Martines, urology surgeon, regarding care of his urostomy and with wound care/

stoma clinic. Return precautions provided; patient is comfortable with this 

plan.





I do not suspect a problem with the stoma itself.  Drainage is normal.  I do 

not find evidence of infection such as UTI or cellulitis.





He is noted to be hypertensive with a triage BP of 149/108 and a DC BP of 144/

93. FU with PCP advised.





Departure





- Departure


Disposition: Home, Routine, Self-Care


Clinical Impression: 


 Complication of urostomy





Condition: Good


Instructions:  Urostomy Care (ED)


Additional Instructions: 


Follow up with Dr. Martines, urologist surgeon, in the next 5-7 days for urostomy 

management. Return to the ED if you experience complication of urostomy, chest 

pain, shortness of breath, abdominal pain, fever or other worsening of your 

symptoms.


Referrals: 


JUAN M ACUNA [Primary Care Provider] - As per Instructions


Jewel Martines MD [Medical Doctor] - As per Instructions


Report Scribed for: Bisi Morales


Report Scribed by: Denisse Villanueva


Date of Report: 01/01/18


Time of Report: 14:23


Physician Review and Approval Statement: 





01/01/18 14:10


Portions of this note were transcribed by the medical scribe.  I, Dr. Bisi Morales, personally performed the history, physical exam, and medical decision-

making; and confirmed the accuracy of the information in the transcribed note.

## 2018-05-11 ENCOUNTER — HOSPITAL ENCOUNTER (INPATIENT)
Dept: HOSPITAL 80 - FED | Age: 68
LOS: 13 days | Discharge: NURSING, REHAB OR LONG-TERM CARE | DRG: 872 | End: 2018-05-24
Attending: INTERNAL MEDICINE | Admitting: INTERNAL MEDICINE
Payer: COMMERCIAL

## 2018-05-11 DIAGNOSIS — R19.7: ICD-10-CM

## 2018-05-11 DIAGNOSIS — Z88.0: ICD-10-CM

## 2018-05-11 DIAGNOSIS — Z99.81: ICD-10-CM

## 2018-05-11 DIAGNOSIS — E87.3: ICD-10-CM

## 2018-05-11 DIAGNOSIS — Z85.51: ICD-10-CM

## 2018-05-11 DIAGNOSIS — Z90.79: ICD-10-CM

## 2018-05-11 DIAGNOSIS — N39.0: ICD-10-CM

## 2018-05-11 DIAGNOSIS — A41.9: Primary | ICD-10-CM

## 2018-05-11 DIAGNOSIS — Z86.14: ICD-10-CM

## 2018-05-11 DIAGNOSIS — A04.72: ICD-10-CM

## 2018-05-11 DIAGNOSIS — J44.9: ICD-10-CM

## 2018-05-11 DIAGNOSIS — J96.11: ICD-10-CM

## 2018-05-11 DIAGNOSIS — I48.0: ICD-10-CM

## 2018-05-11 DIAGNOSIS — E83.42: ICD-10-CM

## 2018-05-11 DIAGNOSIS — I10: ICD-10-CM

## 2018-05-11 DIAGNOSIS — E83.51: ICD-10-CM

## 2018-05-11 DIAGNOSIS — F32.9: ICD-10-CM

## 2018-05-11 DIAGNOSIS — Z59.0: ICD-10-CM

## 2018-05-11 LAB — PLATELET # BLD: 252 10^3/UL (ref 150–400)

## 2018-05-11 PROCEDURE — C1751 CATH, INF, PER/CENT/MIDLINE: HCPCS

## 2018-05-11 RX ADMIN — SODIUM CHLORIDE SCH MLS: 900 INJECTION, SOLUTION INTRAVENOUS at 14:52

## 2018-05-11 RX ADMIN — IPRATROPIUM BROMIDE AND ALBUTEROL SCH PUFFS: 20; 100 SPRAY, METERED RESPIRATORY (INHALATION) at 21:12

## 2018-05-11 RX ADMIN — IPRATROPIUM BROMIDE AND ALBUTEROL SCH: 20; 100 SPRAY, METERED RESPIRATORY (INHALATION) at 16:26

## 2018-05-11 RX ADMIN — Medication SCH UNIT: at 22:35

## 2018-05-11 RX ADMIN — SODIUM CHLORIDE SCH MLS: 900 INJECTION, SOLUTION INTRAVENOUS at 20:35

## 2018-05-11 RX ADMIN — HYDROCODONE BITARTRATE AND ACETAMINOPHEN PRN TAB: 5; 325 TABLET ORAL at 20:34

## 2018-05-11 RX ADMIN — Medication SCH MLS: at 14:53

## 2018-05-11 SDOH — ECONOMIC STABILITY - HOUSING INSECURITY: HOMELESSNESS: Z59.0

## 2018-05-11 NOTE — GHP
[f rep st]



                                                            HISTORY AND PHYSICAL





DATE OF ADMISSION:  2018



Mr. Stack is a pleasant 67-year-old gentleman with a history of bladder cancer, status post radical cy
stoprostatectomy with urostomy, who was referred to the emergency department when he had collapsed in
 line at the [facility].  He was found to have malodorous urine draining from his 
urostomy and was sure he has not had a bag to drain into for about a week.  He claims he is not sure 
of where to put it.  It is not clear that he ran out of the bags.  He said he has had some diarrhea a
nd some fever and chills and malaise, and also he has had malaise for about a week. 



He has had no chest pain.  He has had upper abdominal pain.  No cough.



REVIEW OF SYSTEMS:  Complete review of systems conducted and negative except as noted in the HPI.



PAST MEDICAL HISTORY:  

1.  Bladder cancer status post radical cystoprostatectomy.

2.  Hypertension.

3.  Atrial fibrillation for bladder cancer. 

4.  Diverticulosis.  

5.  Urologic surgery was 2016, as well as an inguinal hernia repair.



ALLERGIES:  Gets a rash to penicillins.



HOME MEDICATIONS:  Based on a list dated yesterday, just Combivent and lisinopril.



SOCIAL HISTORY:  He lives with his son. Has not drank alcohol in a few years.  Unclear if he is a smo
ker.



FAMILY HISTORY:  Parents .



PHYSICAL EXAMINATION:  PRESENTING VITALS:  Temperature 36.5, blood pressure 96/65, pulse 114, now 93,
 breathing 24 times a minute, 91% on room air. GENERAL: In general, no acute distress, disheveled, ch
ronically ill appearing. HEENT: Sclerae anicteric.  Oropharynx clear.  Mucous membranes are moist.  T
here is some very poor dentition.  NECK:  Supple without lymphadenopathy or JVD.  LUNGS:  Clear to au
scultation anterolaterally. HEART: S1, S2.  ABDOMEN:  Soft, nontender, nondistended.  There is a pink
 and red urostomy with surrounding mild cellulitis and malodorous urine.  EXTREMITIES: His lower extr
emities are without edema.  There is some redness around his ankles bilaterally.  Calves are nontende
r.  SKIN:  Otherwise without rash. NEUROLOGIC: Neurologic exam is nonfocal.



LABORATORY:  White count is 19, which is greater than his baseline.  Hematocrit is 41, platelets 252,
000. 



Venous lactate is 2.0.  This is a normal value. Sodium 129, potassium 4.3, chloride 96, bicarb 18, BU
N 61, creatinine 1.6, baseline 1, glucose 122.  There is no urinalysis as of yet.



IMAGING:  Chest x-ray interpreted by me shows no acute cardiopulmonary disease.  



EKG interpreted by me shows sinus tachycardia at 113 with normal axis and intervals.  No ST or T-wave
 changes.  



I have discussed the case with Dr. Luke Victoria.



ASSESSMENT AND PLAN:  67-year-old gentleman presents with sepsis, likely of urinary source.

1.  Sepsis, as evidenced by leukocytosis and source of infection as well as tachycardia.  We will pro
vide him with IV fluid bolus.

2.  Urinary infection.  This is a complicated urine infection in a patient with surgical urinary sadie
concepcion.  He has a history of methicillin-resistant Staphylococcus aureus  in his urine I have learned fr
om reading the chart, therefore, put him on vancomycin and ceftriaxone.  We will await urine culture.


3.  Diarrhea.  Check Clostridium difficile.

4.  Hyponatremia. This is hypovolemic hyponatremia.

5.  Metabolic acidosis.  We will repeat a Chem 7 this afternoon following volume resuscitation.

6.  Prophylaxis.  Pharmacologic prophylaxis is indicated, low-molecular heparin.

7.  Disposition. Inpatient status.





Job #:  [id number]

## 2018-05-11 NOTE — PDMN
Medical Necessity


Medical necessity: Pt meets IP criteria per MD; est los >2 mn for eval/tx of 

sepsis, complicated urinary infection, diarrhea, hyponatremia & metabolic 

acidosis; admit for further workup/monitoring, IVFs, IV abx & therapies; hx 

bladder cancer s/p radical cystoprostatectomy w/urostomy, HTN, AFIB; per H&P & 

order 5/11/18

## 2018-05-11 NOTE — CPEKG
Heart Rate: 113

RR Interval: 531

P-R Interval: 156

QRSD Interval: 104

QT Interval: 344

QTC Interval: 472

P Axis: 24

QRS Axis: 8

T Wave Axis: -12

EKG Severity - BORDERLINE ECG -

EKG Impression: SINUS TACHYCARDIA

EKG Impression: PROBABLE LEFT ATRIAL ABNORMALITY

EKG Impression: BORDERLINE T ABNORMALITIES, INFERIOR LEADS

Electronically Signed By: Robert Andersen 14-May-2018 08:59:36

## 2018-05-11 NOTE — ASMTCMCOM
CM Note

 

CM Note                       

Notes:

Patient admitted with sepsis; source is likely urinary as patient has a surgical urinary 

anatomy. Most of his ED visits (of which there are many) are related to his urostomy bag.



Patient was fairly somnolent when I met with him, but he was able to tell me that he is living with 


his son right now. He has previously been homeless "for a long time," and was also housed at [facility] through the [facility]. He says he lost that housing because he was "wrongly accused." When I 

asked him if he would be able to discharge back to his son's house, he said yes, "but not for 

long." Patient is seen at [facility], with [facility] medical care and [facility] psychiatric 

care. He says he does not have any services at home, and I recommended that he consider a home 

RN. We can order this when he is discharged. Case Management will follow. 

 

Date Signed:  05/11/2018 04:06 PM

Electronically Signed By:Lizzy Erwin RN

## 2018-05-11 NOTE — EDPHY
HPI/HX/ROS/PE/MDM


Narrative: 





CHIEF COMPLAINT: Syncope, weakness





HPI: The patient is a transient 66 y/o male with a history of bladder cancer 

with urostomy who arrives via EMS after a witnessed syncopal episode this 

morning. Per EMS, he was standing in line at the [facility] when he 

suddenly collapse. They arrived to find him pale, diaphoretic, and weak with a 

BP of 90/60 and HR of 120. They noted there was no bag on his urostomy and it 

was leaking malodorous urine directly onto his shirt. He complained to them of 

feeling weak, but is answering few questions upon arrival here. He does mention 

bilateral hip and knee pain during assessment and says he ran out of urostomy 

bags. He is a poor historian and further information is limited at this time.





REVIEW OF SYSTEMS:


Difficult to obtain as patient is a poor historian.





PMH: Bladder cancer with urostomy; multiple orthopedic surgeries





SOCIAL HISTORY: Transient, sometimes lives in [place]. Single. 





PHYSICAL EXAM:


General:Patient is somnolent, minimal verbal responses, chronically-ill 

appearing.


ENT:Eyes are normal to inspection.  ENT inspection normal.


Neck: Normal inspection.  Full range of motion.


Respiratory:No respiratory distress.  Breath sounds normal bilaterally.


Cardiovascular: Regular rate and rhythm.  Strong peripheral pulses.  Normal cap 

refill.


Abdomen:The abdomen is nontender to palpation. There are no peritoneal signs. 

Open stoma on right side of abdomen. Well-healed midline incision. 


Back: Normal to inspection.  No tenderness to palpation.


Skin: Normal color.  No rash.  Warm and dry.


Extremities: Normal appearance.  Full range of motion.


Neuro: Oriented x3.  Normal motor function.  Normal sensory function.


ED Course: 


This is a chronically-ill appearing 66 y/o male with history of bladder cancer 

and urostomy who presents following a witnessed syncopal episode this morning. 

He has been tachycardic, hypotensive, and minimally engaged in assessment since 

EMS contact. He does complain of weakness and bilateral hip and knee pain. He 

has an open urostomy without a bag that is draining malodorous urine onto his 

shirt. Presentation consistent with sepsis and 3L IV NS ordered. Plan for IV, 

labs, UA, cultures, EKG, chest x-ray. Anticipate admission. 





Labs show elevated WBC of 19 and creatinine of 1.6. He continues to be 

hypotensive, but tachycardia has improved. We're unable to collect a urine 

sample and will send a swab instead. 1gm IV Ceftriaxone ordered for suspected 

urosepsis.





Spoke with hospitalist service. Dr. Fritz accepts admission.





- Data Points


Imaging Results: 


 Imaging Impressions





Chest X-Ray  05/11/18 10:58


Impression: 


1. No definite pneumonia.


2. Hypoventilation, bibasilar atelectasis, and chronic mild airways disease 

similar to 2017.











Imaging: I viewed and interpreted images myself


Laboratory Results: 


 Laboratory Results





 05/11/18 11:09 





 05/11/18 11:09 





 











  05/11/18 05/11/18 05/11/18





  11:09 11:09 11:09


 


WBC    19.21 10^3/uL H 10^3/uL  





    (3.80-9.50)  


 


RBC    4.81 10^6/uL 10^6/uL  





    (4.40-6.38)  


 


Hgb    13.5 g/dL L g/dL  





    (13.7-17.5)  


 


Hct    41.0 % %  





    (40.0-51.0)  


 


MCV    85.2 fL fL  





    (81.5-99.8)  


 


MCH    28.1 pg pg  





    (27.9-34.1)  


 


MCHC    32.9 g/dL g/dL  





    (32.4-36.7)  


 


RDW    16.2 % H %  





    (11.5-15.2)  


 


Plt Count    252 10^3/uL 10^3/uL  





    (150-400)  


 


MPV    9.9 fL fL  





    (8.7-11.7)  


 


Neut % (Auto)    88.2 % H %  





    (39.3-74.2)  


 


Lymph % (Auto)    3.5 % L %  





    (15.0-45.0)  


 


Mono % (Auto)    6.9 % %  





    (4.5-13.0)  


 


Eos % (Auto)    0.1 % L %  





    (0.6-7.6)  


 


Baso % (Auto)    0.1 % L %  





    (0.3-1.7)  


 


Nucleat RBC Rel Count    0.0 % %  





    (0.0-0.2)  


 


Absolute Neuts (auto)    16.96 10^3/uL H 10^3/uL  





    (1.70-6.50)  


 


Absolute Lymphs (auto)    0.67 10^3/uL L 10^3/uL  





    (1.00-3.00)  


 


Absolute Monos (auto)    1.33 10^3/uL H 10^3/uL  





    (0.30-0.80)  


 


Absolute Eos (auto)    0.01 10^3/uL L 10^3/uL  





    (0.03-0.40)  


 


Absolute Basos (auto)    0.01 10^3/uL L 10^3/uL  





    (0.02-0.10)  


 


Absolute Nucleated RBC    0.00 10^3/uL 10^3/uL  





    (0-0.01)  


 


Immature Gran %    1.2 % H %  





    (0.0-1.1)  


 


Immature Gran #    0.23 10^3/uL H 10^3/uL  





    (0.00-0.10)  


 


VBG Lactic Acid      2.0 mmol/L mmol/L





     (0.7-2.1) 


 


Sodium  129 mEq/L L mEq/L    





   (135-145)   


 


Potassium  4.3 mEq/L mEq/L    





   (3.5-5.2)   


 


Chloride  96 mEq/L L mEq/L    





   ()   


 


Carbon Dioxide  18 mEq/l L mEq/l    





   (22-31)   


 


Anion Gap  15 mEq/L mEq/L    





   (8-16)   


 


BUN  61 mg/dL H mg/dL    





   (7-23)   


 


Creatinine  1.6 mg/dL H mg/dL    





   (0.7-1.3)   


 


Estimated GFR  43     





    


 


Glucose  122 mg/dL H mg/dL    





   ()   


 


Calcium  8.1 mg/dL L mg/dL    





   (8.5-10.4)   











Medications Given: 


 








Discontinued Medications





Sodium Chloride (Ns)  3,000 mls @ 6,000 mls/hr 30 ml/kg infuse over 30 min (

3000 ml) IV EDNOW ONE


   PRN Reason: Protocol


   Stop: 05/11/18 11:46


   Last Admin: 05/11/18 12:33 Dose:  3,000 mls


Ceftriaxone Sodium/Dextrose (Rocephin 1 Gm (Premix))  50 mls @ 100 mls/hr IV 

EDNOW ONE


   PRN Reason: Protocol


   Stop: 05/11/18 12:12


   Last Admin: 05/11/18 12:33 Dose:  50 mls








General


Time Seen by Provider: 05/11/18 10:54


Initial Vital Signs: 


 Initial Vital Signs











Temperature (C)  36.5 C   05/11/18 11:08


 


Heart Rate  114 H  05/11/18 11:08


 


Respiratory Rate  24 H  05/11/18 11:08


 


Blood Pressure  96/65 L  05/11/18 11:08


 


O2 Sat (%)  91 L  05/11/18 11:08








 











O2 Delivery Mode               Room Air














Allergies/Adverse Reactions: 


 





Penicillins Allergy (Verified 05/11/18 11:12)


 








Home Medications: 














 Medication  Instructions  Recorded


 


DULoxetine [Cymbalta 30 MG (*)] 30 mg PO BID 05/11/18


 


Hydrocodone/Acetaminophen [Norco 1 each PO Q6 PRN 05/11/18





5/325 (*)]  


 


Ipratropium/Albuterol [Combivent 1 inh IH QID 05/11/18





Respimat Inhal Spray(*)]  


 


Lisinopril [Zestril 10 mg (*)] 10 mg PO DAILY 05/11/18














Departure





- Departure


Disposition: [facility] Inpatient Acute


Clinical Impression: 


Sepsis


Qualifiers:


 Sepsis type: sepsis due to unspecified organism Qualified Code(s): A41.9 - 

Sepsis, unspecified organism





Syncope


Qualifiers:


 Syncope type: unspecified Qualified Code(s): R55 - Syncope and collapse





Condition: Fair


Report Scribed for: Luke Victoria


Report Scribed by: Teresa Shore


Date of Report: 05/11/18


Time of Report: 11:49


Physician Review and Approval Statement: Portions of this note were transcribed 

by an ED scribe.  I personally performed the history, physical exam, and 

medical decision making; and confirm the accuracy of the information in the 

transcribed note.

## 2018-05-11 NOTE — WOCRNPDOC
[facility] Advanced Assessment Note





- Skin Integrity Problem, Advanced Assess





  ** Right Shoulder


Dressing Type: Open to Air


Exudate Amount: None


Rosita Wound Tissue: Intact


Site Odor: None


Site Measurement - Head-to-Toe Length X Width X Depth (cm): [id number]


Skin Integrity Problem Comment: Area of blanching erythema noted on patient's R 

shoulder. Currently not a pressure injury. Patient does not know how the 

discoloration occurred. No pressure over the site at this current time. Nursing 

can continue to monitor; wound RN does not need to assess again.





  ** Bilateral Thigh


Dressing Type: Open to Air


Exudate Amount: None


Integumentary Issue Intervention: Barrier Cream Applied (MAD cream ordered from 

pharmacy)


Wound Bed Color: Red


Wound Bed Constitution: Red/Pink - Non Granular Tissue


Skin Integrity Problem Comment: Raw, denuded skin noted in bilateral inner 

thighs. Nursing applied Calazime cream, which is appropriate. However, due to 

patient's c/o pain during assessment, and the scattered satellite lesions 

indicating possible fungal involvement, will have nursing apply cream with zinc/

lido/clotrimazole.





  ** Scrotum


Dressing Type: Open to Air


Integumentary Issue Intervention: Barrier Cream Applied (MAD cream ordered from 

pharmacy)


Wound Bed Color: Red


Wound Bed Constitution: Red/Pink - Non Granular Tissue


Skin Integrity Problem Comment: Raw, denuded skin over scrotum r/t exposure to 

urine from patient's ileal conduit (urostomy) leaking onto his lower abdomen, 

thighs, and groin. Per his report, he had an ostomy appliance in place, but 

doesn't recall what happened to it. Condition of skin indicates that exposure 

to urine has been going on for at least a few days. Urostomy appliance placed 

by Staff RN Amelia, and barrier cream applied to denuded areas. In addition, 

patient's urostomy pouch attached to bedside drainage system by this RN, using 

the Taylor adaptor.





  ** Penis


Dressing Type: Open to Air


Integumentary Issue Intervention: Barrier Cream Applied (MAD cream ordered from 

pharmacy)


Wound Bed Color: Red


Wound Bed Constitution: Red/Pink - Non Granular Tissue


Skin Integrity Problem Comment: Raw, denuded skin noted on penis r/t exposure 

to urine. Retracted patient's foreskin during assessment, and noted mild 

dermatitis throughout. Will have nursing apply MAD cream to protect skin and 

provide pain relief.





- Urostomy Assessment, Advanced


  ** Right Abdomen


Urostomy Appliance Currently in Use: Two Piece Flat, 2 1/4, Cut to Fit


Stoma Turgor: Moist


Stoma Shape: Round


Stoma Height: Protruding


Urostomy Effluent: Urine (cloudy)


Urostomy Details: Ileal Conduit


Urostomy Comment/Treatment Details: 2-piece urostomy appliance placed by Staff 

RN Amelia, who reported that patient was admitted w/ no appliance and incontinence

-associated dermatitis on his inner thighs, groin folds, scrotum, and penis. I 

did not assess peristomal skin, as appliance was just placed; per Amelia, patient 

had no breakdown on his abdomen. Stoma is red, moist, adequately protruding 

into pouch. Cloudy urine noted in pouch, and emptied into bedside drainage 

 using an adaptor. Wound/Ostomy RN will follow up with patient next 

week to inquire about his ostomy supplies and ongoing care.

## 2018-05-12 LAB
INR PPP: 1.14 (ref 0.83–1.16)
PLATELET # BLD: 192 10^3/UL (ref 150–400)
PROTHROMBIN TIME: 14.8 SEC (ref 12–15)

## 2018-05-12 RX ADMIN — IPRATROPIUM BROMIDE AND ALBUTEROL SCH: 20; 100 SPRAY, METERED RESPIRATORY (INHALATION) at 06:13

## 2018-05-12 RX ADMIN — IPRATROPIUM BROMIDE AND ALBUTEROL SCH: 20; 100 SPRAY, METERED RESPIRATORY (INHALATION) at 11:59

## 2018-05-12 RX ADMIN — Medication SCH MLS: at 14:33

## 2018-05-12 RX ADMIN — HYDROCODONE BITARTRATE AND ACETAMINOPHEN PRN TAB: 5; 325 TABLET ORAL at 10:08

## 2018-05-12 RX ADMIN — ACETAMINOPHEN PRN MG: 325 TABLET ORAL at 04:30

## 2018-05-12 RX ADMIN — VANCOMYCIN HYDROCHLORIDE SCH MG: KIT at 13:24

## 2018-05-12 RX ADMIN — SODIUM CHLORIDE SCH MLS: 900 INJECTION, SOLUTION INTRAVENOUS at 02:41

## 2018-05-12 RX ADMIN — VANCOMYCIN HYDROCHLORIDE SCH MG: KIT at 21:19

## 2018-05-12 RX ADMIN — VANCOMYCIN HYDROCHLORIDE SCH MG: KIT at 06:20

## 2018-05-12 RX ADMIN — Medication SCH APP: at 09:02

## 2018-05-12 RX ADMIN — HYDROCODONE BITARTRATE AND ACETAMINOPHEN PRN TAB: 5; 325 TABLET ORAL at 21:18

## 2018-05-12 RX ADMIN — VANCOMYCIN HYDROCHLORIDE SCH MG: KIT at 00:55

## 2018-05-12 RX ADMIN — ENOXAPARIN SODIUM SCH MG: 100 INJECTION SUBCUTANEOUS at 08:57

## 2018-05-12 RX ADMIN — SODIUM CHLORIDE SCH MLS: 900 INJECTION, SOLUTION INTRAVENOUS at 17:30

## 2018-05-12 RX ADMIN — IPRATROPIUM BROMIDE AND ALBUTEROL SCH: 20; 100 SPRAY, METERED RESPIRATORY (INHALATION) at 22:09

## 2018-05-12 RX ADMIN — IPRATROPIUM BROMIDE AND ALBUTEROL SCH PUFFS: 20; 100 SPRAY, METERED RESPIRATORY (INHALATION) at 15:34

## 2018-05-12 RX ADMIN — Medication SCH: at 21:15

## 2018-05-12 RX ADMIN — HYDROCODONE BITARTRATE AND ACETAMINOPHEN PRN TAB: 5; 325 TABLET ORAL at 00:55

## 2018-05-12 RX ADMIN — VANCOMYCIN HYDROCHLORIDE SCH MG: KIT at 16:38

## 2018-05-12 RX ADMIN — SODIUM CHLORIDE SCH MLS: 900 INJECTION, SOLUTION INTRAVENOUS at 06:20

## 2018-05-12 RX ADMIN — Medication SCH MLS: at 02:40

## 2018-05-12 NOTE — HOSPPROG
Hospitalist Progress Note


Assessment/Plan: 





66 yo M w sepsis, complicated uti, cdiff, kathi





cdiff: po vanco


   abdomen soft


   per pt, diarhea improevd





sepsis: septic physiology has improved





complicated uti: h/o mrsa uti, which is not surprising given stoma and poor 

care as utpt


   vanc ceftriaxone day 2


   await cx





KATHI: pre renal


   improved





urostomy: wound care seeing


   will need urostomy supplies on dc





proph: lmwh





dispo: inpt, to floor


   


Subjective: tele: no events (interp by me).  more alert


Objective: 


 Vital Signs











Temp Pulse Resp BP Pulse Ox


 


 36.9 C   76   19   90/54 L  92 


 


 05/11/18 22:00  05/12/18 07:00  05/12/18 07:00  05/12/18 07:00  05/12/18 07:00








 Laboratory Results





 05/12/18 04:30 





 05/12/18 04:30 





 











 05/11/18 05/12/18 05/13/18





 05:59 05:59 05:59


 


Intake Total  6650 


 


Output Total  2450 


 


Balance  4200 








 











PT  14.8 SEC (12.0-15.0)   05/12/18  04:30    


 


INR  1.14  (0.83-1.16)   05/12/18  04:30    














- Physical Exam


Constitutional: no apparent distress, appears nourished


Eyes: PERRL, anicteric sclera


Ears, Nose, Mouth, Throat: moist mucous membranes, hearing normal


Cardiovascular: regular rate and rhythym, no murmur, rub, or gallop, No 

tachycardia


Respiratory: no respiratory distress, no rales or rhonchi


Gastrointestinal: normoactive bowel sounds, soft, non-tender abdomen


Genitourinary: no bladder fullness, other (urostomy pink, some purulence around 

stoma), No christie in urethra


Skin: warm, normal color


Musculoskeletal: normal joint ROM


Neurologic: AAOx3, sensation intact bilaterally


Psychiatric: interacting appropriately





ICD10 Worksheet


Patient Problems: 


 Problems











Problem Status Onset


 


Sepsis Acute  


 


Syncope Acute  


 


Abdominal pain Acute  


 


Bladder cancer Acute  


 


Edema Acute  


 


Gross hematuria Acute  


 


Hydronephrosis, left Acute  


 


MRSA (methicillin resistant Staphylococcus aureus) Acute  ~06/04/17


 


Shortness of breath Acute  


 


Urinary tract infection Acute

## 2018-05-13 LAB — PLATELET # BLD: 210 10^3/UL (ref 150–400)

## 2018-05-13 RX ADMIN — VANCOMYCIN HYDROCHLORIDE SCH MG: KIT at 05:42

## 2018-05-13 RX ADMIN — VANCOMYCIN HYDROCHLORIDE SCH MG: KIT at 20:53

## 2018-05-13 RX ADMIN — Medication SCH APP: at 20:57

## 2018-05-13 RX ADMIN — ENOXAPARIN SODIUM SCH MG: 100 INJECTION SUBCUTANEOUS at 10:10

## 2018-05-13 RX ADMIN — IPRATROPIUM BROMIDE AND ALBUTEROL SCH: 20; 100 SPRAY, METERED RESPIRATORY (INHALATION) at 12:17

## 2018-05-13 RX ADMIN — IPRATROPIUM BROMIDE AND ALBUTEROL SCH: 20; 100 SPRAY, METERED RESPIRATORY (INHALATION) at 05:30

## 2018-05-13 RX ADMIN — Medication SCH MLS: at 16:40

## 2018-05-13 RX ADMIN — Medication SCH MLS: at 04:32

## 2018-05-13 RX ADMIN — HYDROCODONE BITARTRATE AND ACETAMINOPHEN PRN TAB: 5; 325 TABLET ORAL at 18:33

## 2018-05-13 RX ADMIN — Medication SCH APP: at 12:20

## 2018-05-13 RX ADMIN — VANCOMYCIN HYDROCHLORIDE SCH MG: KIT at 12:39

## 2018-05-13 RX ADMIN — SODIUM CHLORIDE SCH MLS: 900 INJECTION, SOLUTION INTRAVENOUS at 15:38

## 2018-05-13 RX ADMIN — VANCOMYCIN HYDROCHLORIDE SCH MG: KIT at 16:39

## 2018-05-13 RX ADMIN — IPRATROPIUM BROMIDE AND ALBUTEROL SCH: 20; 100 SPRAY, METERED RESPIRATORY (INHALATION) at 16:10

## 2018-05-13 RX ADMIN — SODIUM CHLORIDE SCH MLS: 900 INJECTION, SOLUTION INTRAVENOUS at 07:55

## 2018-05-13 RX ADMIN — IPRATROPIUM BROMIDE AND ALBUTEROL SCH: 20; 100 SPRAY, METERED RESPIRATORY (INHALATION) at 22:44

## 2018-05-13 RX ADMIN — HYDROCODONE BITARTRATE AND ACETAMINOPHEN PRN TAB: 5; 325 TABLET ORAL at 10:11

## 2018-05-13 NOTE — HOSPPROG
Hospitalist Progress Note


Assessment/Plan: 





68 yo M w sepsis, complicated uti, cdiff, kathi





cdiff: po vanco


   abdomen soft


   per pt, diarhea improevd





sepsis: septic physiology has improved





Hypotension: still with soft BP, good urine output





complicated uti: h/o mrsa uti, which is not surprising given stoma and poor 

care as utpt


   vanc ceftriaxone day 3


   await cx





KATHI: pre renal


   improved





urostomy: wound care seeing


   will need urostomy supplies on dc





Hypoxemia


   ?fluid





proph: lmwh





dispo: inpt, to floor








Plan:


The patients uop is better. urine is dilute. However BP is still soft with 

systolic in the 90's. Will decrease IVF. Check CXR


Given soft BP, repeat lactic acid and pc


Abd is distended but unclear baseline. no pain. will check KUB


cont all abx, no change at this time


Subjective: no cp. some hypoxemia. still confused. no leg swelling. good urine 

output


Objective: 


 Vital Signs











Temp Pulse Resp BP Pulse Ox


 


 36.3 C   92   18   96/54 L  92 


 


 05/13/18 12:09  05/13/18 12:09  05/13/18 12:09  05/13/18 12:09  05/13/18 12:09








 Laboratory Results





 05/13/18 03:20 





 05/13/18 03:20 





 











 05/12/18 05/13/18 05/14/18





 05:59 05:59 05:59


 


Intake Total 6650 4891 472


 


Output Total 2450 3050 1250


 


Balance 4200 1841 -778








 











PT  14.8 SEC (12.0-15.0)   05/12/18  04:30    


 


INR  1.14  (0.83-1.16)   05/12/18  04:30    














- Physical Exam


Constitutional: no apparent distress


Eyes: PERRL, EOMI


Ears, Nose, Mouth, Throat: moist mucous membranes, hearing normal, ears appear 

normal


Cardiovascular: regular rate and rhythym, No edema


Respiratory: reduced air movement


Gastrointestinal: normoactive bowel sounds, soft, non-tender abdomen, no 

palpable masses


Genitourinary: no bladder fullness


Skin: warm


Neurologic: AAOx3


Psychiatric: not anxious, encephalopathic





ICD10 Worksheet


Patient Problems: 


 Problems











Problem Status Onset


 


Sepsis Acute  


 


Syncope Acute  


 


Abdominal pain Acute  


 


Bladder cancer Acute  


 


Edema Acute  


 


Gross hematuria Acute  


 


Hydronephrosis, left Acute  


 


MRSA (methicillin resistant Staphylococcus aureus) Acute  ~06/04/17


 


Shortness of breath Acute  


 


Urinary tract infection Acute

## 2018-05-14 LAB — PLATELET # BLD: 235 10^3/UL (ref 150–400)

## 2018-05-14 PROCEDURE — 02HV33Z INSERTION OF INFUSION DEVICE INTO SUPERIOR VENA CAVA, PERCUTANEOUS APPROACH: ICD-10-PCS | Performed by: RADIOLOGY

## 2018-05-14 RX ADMIN — IPRATROPIUM BROMIDE AND ALBUTEROL SCH PUFFS: 20; 100 SPRAY, METERED RESPIRATORY (INHALATION) at 21:14

## 2018-05-14 RX ADMIN — VANCOMYCIN HYDROCHLORIDE SCH MG: KIT at 06:25

## 2018-05-14 RX ADMIN — Medication SCH MLS: at 03:29

## 2018-05-14 RX ADMIN — Medication SCH MLS: at 16:57

## 2018-05-14 RX ADMIN — VANCOMYCIN HYDROCHLORIDE SCH MG: KIT at 22:25

## 2018-05-14 RX ADMIN — ENOXAPARIN SODIUM SCH MG: 100 INJECTION SUBCUTANEOUS at 08:49

## 2018-05-14 RX ADMIN — Medication SCH APP: at 11:35

## 2018-05-14 RX ADMIN — HYDROCODONE BITARTRATE AND ACETAMINOPHEN PRN TAB: 5; 325 TABLET ORAL at 08:49

## 2018-05-14 RX ADMIN — SODIUM CHLORIDE SCH MLS: 900 INJECTION, SOLUTION INTRAVENOUS at 00:19

## 2018-05-14 RX ADMIN — VANCOMYCIN HYDROCHLORIDE SCH MG: KIT at 12:25

## 2018-05-14 RX ADMIN — IPRATROPIUM BROMIDE AND ALBUTEROL SCH: 20; 100 SPRAY, METERED RESPIRATORY (INHALATION) at 06:06

## 2018-05-14 RX ADMIN — VANCOMYCIN HYDROCHLORIDE SCH MG: KIT at 16:57

## 2018-05-14 RX ADMIN — Medication SCH APP: at 22:25

## 2018-05-14 RX ADMIN — HYDROCODONE BITARTRATE AND ACETAMINOPHEN PRN TAB: 5; 325 TABLET ORAL at 00:19

## 2018-05-14 RX ADMIN — IPRATROPIUM BROMIDE AND ALBUTEROL SCH PUFFS: 20; 100 SPRAY, METERED RESPIRATORY (INHALATION) at 11:27

## 2018-05-14 RX ADMIN — IPRATROPIUM BROMIDE AND ALBUTEROL SCH: 20; 100 SPRAY, METERED RESPIRATORY (INHALATION) at 15:45

## 2018-05-14 NOTE — HOSPPROG
Hospitalist Progress Note


Assessment/Plan: 





66 yo M w sepsis, complicated uti, cdiff, kathi





cdiff: po vanco


   abdomen soft


   per pt, diarhea improved





sepsis: septic physiology has improved, in setting of c diff and UTI 





Hypotension: BP improved to the low normal range, good urine output





complicated uti: h/o mrsa uti, which is not surprising given stoma and poor 

care as utpt


   vanc ceftriaxone day 3


   blood cultures negative, urine cultures not sent on arrival and prior to 

initiation of abx, will ask ID to assist in abx choice given limited data





KATHI: pre renal


   improved





urostomy: wound care seeing


   will need urostomy supplies on dc





Hypoxemia


   ?fluid





proph: lmwh





dispo: inpt, patient states he is largely residing in shelter, CM involved and 

looking into options


Patient new to my care. Old records reviewed and summarized as above. 








Subjective: no signficiant overnight events, patient remains feeling weak, 

diarrhea has improved


Objective: 


 Vital Signs











Temp Pulse Resp BP Pulse Ox


 


 36.4 C   89   20   115/81 H  97 


 


 05/14/18 15:23  05/14/18 15:23  05/14/18 15:23  05/14/18 15:23  05/14/18 15:23








 Laboratory Results





 05/14/18 04:15 





 05/14/18 04:15 





 











 05/13/18 05/14/18 05/15/18





 05:59 05:59 05:59


 


Intake Total 4891 8540 


 


Output Total 3050 3575 1850


 


Balance 1841 4965 -1850








 











PT  14.8 SEC (12.0-15.0)   05/12/18  04:30    


 


INR  1.14  (0.83-1.16)   05/12/18  04:30    








awake alert nad


anicteric


op clear


rrr no mrg


cta b


soft nt nd


no cce


warm dry well perfused


oriented approriate





ICD10 Worksheet


Patient Problems: 


 Problems











Problem Status Onset


 


Sepsis Acute  


 


Syncope Acute  


 


Abdominal pain Acute  


 


Bladder cancer Acute  


 


Edema Acute  


 


Gross hematuria Acute  


 


Hydronephrosis, left Acute  


 


MRSA (methicillin resistant Staphylococcus aureus) Acute  ~06/04/17


 


Shortness of breath Acute  


 


Urinary tract infection Acute

## 2018-05-14 NOTE — ASMTCMCOM
CM Note

 

CM Note                       

Notes:

Chart reviewed. 67 year old homeless male admitted for recurrent UTI and sepsis. He reports he 

lives between his sons apartment and the shelter. S/P bladder cancer and urostomy that he is unable 


to care for as he is a transient. Referrals made to SNF's in [place]. PASSR triggered due to 

antidepressants and current living situations. I spoke with [facility] "[facility]" who reports patient 

had been referred to the shelter but may be under a ban. Will attempt to speak to shelter personnel 


later for clarification. CM to follow.



Plan: TBD

 

Date Signed:  05/14/2018 01:42 PM

Electronically Signed By:Colleen Marks RN

## 2018-05-15 RX ADMIN — IPRATROPIUM BROMIDE AND ALBUTEROL SCH PUFFS: 20; 100 SPRAY, METERED RESPIRATORY (INHALATION) at 05:45

## 2018-05-15 RX ADMIN — IPRATROPIUM BROMIDE AND ALBUTEROL SCH PUFFS: 20; 100 SPRAY, METERED RESPIRATORY (INHALATION) at 16:15

## 2018-05-15 RX ADMIN — VANCOMYCIN HYDROCHLORIDE SCH MG: KIT at 05:50

## 2018-05-15 RX ADMIN — VANCOMYCIN HYDROCHLORIDE SCH MG: KIT at 12:22

## 2018-05-15 RX ADMIN — VANCOMYCIN HYDROCHLORIDE SCH MLS: 1 INJECTION, POWDER, LYOPHILIZED, FOR SOLUTION INTRAVENOUS at 16:19

## 2018-05-15 RX ADMIN — VANCOMYCIN HYDROCHLORIDE SCH MG: KIT at 21:18

## 2018-05-15 RX ADMIN — IPRATROPIUM BROMIDE AND ALBUTEROL SCH PUFFS: 20; 100 SPRAY, METERED RESPIRATORY (INHALATION) at 20:01

## 2018-05-15 RX ADMIN — ENOXAPARIN SODIUM SCH MG: 100 INJECTION SUBCUTANEOUS at 09:20

## 2018-05-15 RX ADMIN — Medication SCH APP: at 21:18

## 2018-05-15 RX ADMIN — SODIUM CHLORIDE SCH MLS: 900 INJECTION, SOLUTION INTRAVENOUS at 06:25

## 2018-05-15 RX ADMIN — Medication SCH APP: at 16:19

## 2018-05-15 RX ADMIN — HYDROCODONE BITARTRATE AND ACETAMINOPHEN PRN TAB: 5; 325 TABLET ORAL at 09:17

## 2018-05-15 RX ADMIN — IPRATROPIUM BROMIDE AND ALBUTEROL SCH PUFFS: 20; 100 SPRAY, METERED RESPIRATORY (INHALATION) at 12:26

## 2018-05-15 RX ADMIN — VANCOMYCIN HYDROCHLORIDE SCH MLS: 1 INJECTION, POWDER, LYOPHILIZED, FOR SOLUTION INTRAVENOUS at 02:59

## 2018-05-15 RX ADMIN — VANCOMYCIN HYDROCHLORIDE SCH MG: KIT at 16:49

## 2018-05-15 NOTE — HOSPPROG
Hospitalist Progress Note


Assessment/Plan: 





66 yo M w sepsis, complicated uti, cdiff, kathi





# cdiff: po vanco continued for now, diarrhea improving


# sepsis: septic physiology has improved, in setting of c diff and UTI 


# Hypotension: BP improved to the low normal range, good urine output


# ? UTI: in the setting of urostomy in place and abnormal UA on arrival, also 

with e/o sepsis on arrival. Has hx of mrsa UTI in past, started on IV vanc/ctx 

but no urine culture obtained on arrival. Appreciate ID input, will continue 

current abx for 7 day course and then dc. 


# KATHI: pre renal, improved


# urostomy: wound care seeing


# Hypoxemia--mild and suspect largely related to immobility/atelectasis, as next


# deconditioning: patient with very limited mobility at this point and will 

need placement after dc, CM involved, pt/ot working with patient


#proph: lmwh


#dispo: inpt, will likely be ready to dc in coming days if snf found








Subjective: no significant overnight events, patient is currently doing well 

though very sleepy today and remains quite weak, working on increasing his 

mobility


Objective: 


 Vital Signs











Temp Pulse Resp BP Pulse Ox


 


 36.5 C   92   19   143/92 H  96 


 


 05/15/18 15:46  05/15/18 15:46  05/15/18 15:46  05/15/18 15:46  05/15/18 15:46








 Laboratory Results





 05/14/18 04:15 





 05/14/18 04:15 





 











 05/14/18 05/15/18 05/16/18





 05:59 05:59 05:59


 


Intake Total 8540 4142 


 


Output Total 3575 3450 


 


Balance 4965 692 








 











PT  14.8 SEC (12.0-15.0)   05/12/18  04:30    


 


INR  1.14  (0.83-1.16)   05/12/18  04:30    








awake alert nad


anicteric


op clear


rrr no mrg


cta b


soft nt nd


no cce


warm dry well perfused


oriented approriate








ICD10 Worksheet


Patient Problems: 


 Problems











Problem Status Onset


 


Sepsis Acute  


 


Syncope Acute  


 


Abdominal pain Acute  


 


Bladder cancer Acute  


 


Edema Acute  


 


Gross hematuria Acute  


 


Hydronephrosis, left Acute  


 


MRSA (methicillin resistant Staphylococcus aureus) Acute  ~06/04/17


 


Shortness of breath Acute  


 


Urinary tract infection Acute

## 2018-05-15 NOTE — GCON
[f rep st]



                                                                    CONSULTATION





INFECTIOUS DISEASE CONSULTATION



REFERRING PHYSICIAN:  Anupam Vazquez MD





REASON FOR CONSULTATION:  Clostridium difficile and possible urinary tract infection.



CHIEF COMPLAINT:  Diarrhea.



HISTORY OF PRESENT ILLNESS:  This is a 67-year-old man with a past medical history significant for bl
adder cancer, status post radical cystoprostatectomy with urostomy, hypertension, who was admitted on
 the  after collapsing at a clinic.  He tells me that for several days prior to his collapse, he 
was having diarrhea about 6 to 8 times a day, mostly watery, but occasionally bloody, that he thinks.
  He is not sure if he had fevers and shaking chills.  He was having some abdominal discomfort along 
with that.  He also was having some radiation of pain to the flanks with again some discomfort around
 his urostomy area.  He says sometimes when he has a urinary infection, he gets the same symptoms as 
well.  When he was brought here to the ER, temperature was 36.5, respiratory rate was 24, blood press
ure was 94/51, and his heart rate was 114.  He was found have leukocytosis.  Blood cultures x2 sets w
ere drawn and those are so far no growth.  He was started on ceftriaxone and vancomycin.  He also had
 a stool study done for C. difficile which was positive, and he was also started on oral vancomycin a
s well.  Since that time, he states he is feeling better.  His white blood cell count has dropped fernando
n into the normal range.  A urine culture was never sent on admission and apparently was sent today. 
 In the past, he has had MRSA in his urine upon review of his previous microbiologic data from last y
ear.  His diarrhea is starting to improved as well.  Infectious Disease is now consulted for further 
evaluation and opinion.



REVIEW OF SYSTEMS:  GENERAL: He even sure if he had any fevers or chills. HEAD: No headaches. EYES: N
o change in vision. ENT: No sore throat, difficulty swallowing, ear pain or ear drainage. CARDIOVASCU
LAR: No chest pain or rapid heartbeat. RESPIRATORY: Currently no shortness of breath, cough, or sputu
m production. ABDOMEN: Abdomen feels better.  He previously had pain.  No nausea.  Diarrhea is improv
ing. GENITOURINARY: He has a urostomy in place.  MUSCULOSKELETAL: Denies any joint pain. SKIN: Denies
 any rashes or open wounds.  Rest of a 10-point review of systems is essentially negative except for 
above.



PAST MEDICAL HISTORY:  Significant for hypertension, atrial fibrillation, diverticulosis, bladder can
cer, inguinal hernia repair.



PAST SURGICAL HISTORY:  Significant for radical cystoprostatectomy.



ALLERGIES:  A rash to penicillin.



SOCIAL HISTORY:  He lives with his son.  Formally drank alcohol and denies any smoking.



FAMILY HISTORY:  His parents are .



MEDICATIONS:  As per MAR.



PHYSICAL EXAMINATION:  VITAL SIGNS:  Temperature current 36.5, pulse is 93, blood pressure 127/82, re
spiratory rate is 20, saturation 95% on 2 L O2 via nasal cannula.  GENERAL:  Patient is resting in be
d.  No acute respiratory distress.  Awake, alert, and oriented x3. HEENT: Head is normocephalic, atra
umatic.  Eyes without conjunctival injection or petechiae.  Oropharynx is clear.  He has missing teet
h.  CARDIOVASCULAR:  S1, S2.  Regular rate and rhythm.  No murmurs appreciated.  RESPIRATORY:  Clear 
to auscultate bilaterally.  No rhonchi or rales appreciated. ABDOMEN: Positive bowel sounds in all qu
adrants.  Soft, nondistended.  Mild tenderness around the ostomy bag.  EXTREMITIES:  Without lower ex
tremity edema. MUSCULOSKELETAL: No joint effusions.  SKIN: He has multiple abrasions over both of his
 elbows and both his knees, particularly his right knee has some purulent drainage noted.



LABORATORY:  White blood cell count 9.0, down from 19.2, hemoglobin 9.7, platelets are 235, neutrophi
l count is 83%.  Sodium 144, potassium 4.3, chloride 116, bicarb 22, BUN 19, creatinine 1.0.  AST 14,
 ALT 36, alkaline phosphatase 57, total bilirubin 0.2.  Urinalysis 3+ leukocyte esterases, negative n
itrites.  Urine WBCs 50 to 182. Urine RBCs 15 to 25.  Vancomycin trough 10.4.  C. difficile positive.
 Blood cultures, no growth to date.  Urine culture from today pending.



IMAGING:  Chest x-ray shows no evidence of pneumonia on the admission x-ray.  Follow up x-ray suggest
ing maybe possibly a pleural effusion.



ASSESSMENT:  

1.  Clostridium difficile.

2.  Multiple wounds on his bilateral elbows and bilateral knees with some purulent drainage.

3.  Possible urinary tract infection.



PLAN:  The patient is currently on oral vancomycin for the C. difficile, which I agree with and claudia
august as is for now.  The patient is also currently on IV vancomycin and ceftriaxone for a possible uri
nary tract infection.  Unfortunately, no urine culture was done at the time of admission and in this 
circumstance, it may be difficult to interpret the UA primarily to definitively say he has a urinary 
tract infection, although he does have some symptoms that did arise that are typical for him when he 
does get a urinary infection.  For now, a plan of a short course of therapy for that.  I appreciate CROW Ramos's evaluation and care to all his wound sites.  



Thank you very much for providing this opportunity to care for your patient in consultation.





Job #:  [id number]

## 2018-05-15 NOTE — ASMTCMCOM
CM Note

 

CM Note                       

Notes:

I spoke with Jerri, patient's Chelle RN ([phone]), who provided some background 

information. Patient has been seeing her regularly since 11/17 at [facility] for 

ostomy/urostomy supplies and support. She said that he has been living with his son in a 

one-bedroom apartment. She'd never met him or spoken to him. Per Jerri and notes from patient's 


previous hospitalizations, he's been homeless, housed through the Shelter, kicked out of the 

Shelter, and also asked to leave [facility]. I've only corroborated some of that history with 

patient. Jerri gave me the name and number of Geovanna Cisneros, patient's [facility] counselor 

([phone]) who may be able to answer some of the housing questions. I called and left her a 

message.



I went to speak with patient about discharge planning. He's difficult to speak with, a man of few 

words who can be gruff but who later apologizes. It's hard to get his take on the situation. He 

keeps saying he lives with his son "off and on." He told me his son's name was Brian but that he 

didn't know his phone number. He also said Brian hasn't been by the hospital. I called the number 

for his brother Amadeo (listed in demographics) but there was no answer. 



We are trying to get the patient to a SNF. Jerri said that he had had family at the [facility] in 

[place]; patient did confirm that his mother lived there. While he initially said he wouldn't 

go, he later told me he would. He said he was "sick of moving around all the time." The [facility] is 

reviewing him for admission. 



This patient has the benefit of some good community support, and I wish we could enlist his 

son, although none of his providers have that contact information. Case Management will continue to 


follow. 

 

Date Signed:  05/15/2018 04:14 PM

Electronically Signed By:Lizzy Erwin RN

## 2018-05-16 RX ADMIN — VANCOMYCIN HYDROCHLORIDE SCH MG: KIT at 20:39

## 2018-05-16 RX ADMIN — IPRATROPIUM BROMIDE AND ALBUTEROL SCH PUFFS: 20; 100 SPRAY, METERED RESPIRATORY (INHALATION) at 06:21

## 2018-05-16 RX ADMIN — VANCOMYCIN HYDROCHLORIDE SCH MLS: 1 INJECTION, POWDER, LYOPHILIZED, FOR SOLUTION INTRAVENOUS at 03:02

## 2018-05-16 RX ADMIN — SODIUM CHLORIDE SCH MLS: 900 INJECTION, SOLUTION INTRAVENOUS at 03:03

## 2018-05-16 RX ADMIN — IPRATROPIUM BROMIDE AND ALBUTEROL SCH: 20; 100 SPRAY, METERED RESPIRATORY (INHALATION) at 16:16

## 2018-05-16 RX ADMIN — Medication SCH APP: at 10:35

## 2018-05-16 RX ADMIN — Medication SCH APP: at 20:40

## 2018-05-16 RX ADMIN — VANCOMYCIN HYDROCHLORIDE SCH MG: KIT at 12:01

## 2018-05-16 RX ADMIN — VANCOMYCIN HYDROCHLORIDE SCH MG: KIT at 06:00

## 2018-05-16 RX ADMIN — ENOXAPARIN SODIUM SCH MG: 100 INJECTION SUBCUTANEOUS at 10:35

## 2018-05-16 RX ADMIN — VANCOMYCIN HYDROCHLORIDE SCH MG: KIT at 18:31

## 2018-05-16 RX ADMIN — IPRATROPIUM BROMIDE AND ALBUTEROL SCH: 20; 100 SPRAY, METERED RESPIRATORY (INHALATION) at 22:39

## 2018-05-16 RX ADMIN — IPRATROPIUM BROMIDE AND ALBUTEROL SCH PUFFS: 20; 100 SPRAY, METERED RESPIRATORY (INHALATION) at 10:26
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
## 2018-05-16 NOTE — HOSPPROG
Hospitalist Progress Note


Assessment/Plan: 





68 yo M w sepsis, complicated uti, cdiff, kathi





# cdiff: po vanco continued for now, diarrhea improving


# sepsis: septic physiology has improved, in setting of c diff and UTI 


# Hypotension: BP improved to the low normal range, good urine output


# ? UTI: in the setting of urostomy in place and abnormal UA on arrival, also 

with e/o sepsis on arrival. Has hx of mrsa UTI in past, started on IV vanc/ctx 

but no urine culture obtained on arrival. Appreciate ID input, dc abx today 

given negative urine cultures


# KATHI: pre renal, improved


# urostomy: wound care seeing


# Hypoxemia--mild and suspect largely related to immobility/atelectasis, as next


# deconditioning: patient with very limited mobility at this point and will 

need placement after dc, CM involved, pt/ot working with patient


#proph: lmwh


#dispo: inpt, will likely be ready to dc in coming days if snf found








Subjective: no significant overnight events, patient somnolent again today


Objective: 


 Vital Signs











Temp Pulse Resp BP Pulse Ox


 


 37.0 C   91   18   127/81 H  95 


 


 05/16/18 16:00  05/16/18 16:00  05/16/18 16:00  05/16/18 16:00  05/16/18 16:00








 Laboratory Results





 05/14/18 04:15 





 05/14/18 04:15 





 











 05/15/18 05/16/18 05/17/18





 05:59 05:59 05:59


 


Intake Total 4142 3709 1345


 


Output Total 3450 3350 2450


 


Balance 692 359 -1105








 











PT  14.8 SEC (12.0-15.0)   05/12/18  04:30    


 


INR  1.14  (0.83-1.16)   05/12/18  04:30    








awake alert nad


anicteric


op clear


rrr no mrg


cta b


soft nt nd


no cce


warm dry well perfused


oriented approriate





ICD10 Worksheet


Patient Problems: 


 Problems











Problem Status Onset


 


Sepsis Acute  


 


Syncope Acute  


 


Abdominal pain Acute  


 


Bladder cancer Acute  


 


Edema Acute  


 


Gross hematuria Acute  


 


Hydronephrosis, left Acute  


 


MRSA (methicillin resistant Staphylococcus aureus) Acute  ~06/04/17


 


Shortness of breath Acute  


 


Urinary tract infection Acute

## 2018-05-16 NOTE — WOCRNPDOC
WOCRN Advanced Assessment Note





- Skin Integrity Problem, Advanced Assess


  ** Right Knee


Dressing Type: Allevyn Life


Dressing Description: Clean/Dry, Intact


Exudate Amount: Scant


Exudate Color: Reddish/Yellow


Exudate Characteristic(s): Serosanguinous


Integumentary Issue Intervention: Visualized Under Dressing


Wound Bed Color: Pink, Red


Wound Bed Constitution: Red/Pink - Non Granular Tissue (100%)


Wound Edges: Irregular


Site Measurement - Head-to-Toe Length X Width X Depth (cm): 2.3x4.8x0.1


Skin Integrity Problem Comment: Partial thickness wound on right knee, possibly 

a skin tear from a fall. Part of skin flap adhered to Allevyn dressing. Will 

recommend continued use of Allevyn with wound gel for treatment instead of skin 

tear protocol due to location. Wound care will sign off on this wound.





  ** Left Knee


Dressing Type: Allevyn Life


Dressing Description: Clean/Dry, Intact


Exudate Amount: Scant


Exudate Color: Reddish/Yellow


Exudate Characteristic(s): Seropurulent, Serosanguinous


Integumentary Issue Intervention: Visualized Under Dressing


Wound Bed Color: Pink, Red


Wound Bed Constitution: Red/Pink - Non Granular Tissue (100%)


Wound Edges: Irregular


Site Odor: Slight


Site Measurement - Head-to-Toe Length X Width X Depth (cm): 3.1x2.3x0.2


Skin Integrity Problem Comment: Partial thickness wound to left knee, possibly 

a skin tear. Proximal medial portion of wound has scant creamy exudate, 

otherwise no drainage visible. Wound did not look infected. Recommend use of 

silvasorb on wound bed. Wound care will sign off on this wound.

## 2018-05-16 NOTE — PCMIDPN
Assessment/Plan: 


Assessment/Plan:


* C difficile colitis:  Diarrhea has decreased with oral vancomycin.  Will 

continue oral vancomycin and follow clinical response.


* Possible UTI:  Difficult to assess for UTI based on presence of urostomy.  

Now has received 5 days of vancomycin and ceftriaxone.  Given presence of C 

difficile and unclear if UTI contributing, will discontinue vancomycin 

ceftriaxone to limit broad-spectrum antibiotic exposure.  Follow-up urine 

culture which currently shows no growth to date.





05/16/18 14:51





Subjective: 





Patient complains of lower abdominal discomfort.  1 episode of diarrhea today.


Objective: 


 Vital Signs











Temp Pulse Resp BP Pulse Ox


 


 36.6 C   89   18   149/95 H  96 


 


 05/16/18 08:00  05/16/18 10:26  05/16/18 10:26  05/16/18 08:00  05/16/18 10:26








 Laboratory Results





 05/14/18 04:15 





 05/14/18 04:15 





 











 05/15/18 05/16/18 05/17/18





 05:59 05:59 05:59


 


Intake Total 4142 3709 1345


 


Output Total 3450 3350 1000


 


Balance 692 359 345








p.o. Vancomycin #5


IV vancomycin # 6


IV ceftriaxone # 6


Blood cultures x2 no growth


Urine culture no growth to date (obtained after antibiotic therapy initiated)





- Physical Exam


General Appearance: alert, no apparent distress


EENT: No scleral icterus


Respiratory: lungs clear, No respiratory distress


Cardiac/Chest: regular rate, rhythm


Abdomen: non-tender, No distended


Skin: other (Abrasions over both knees without active cellulitis or purulent 

drainage)





ICD10 Worksheet


Patient Problems: 


 Problems











Problem Status Onset


 


Sepsis Acute  


 


Syncope Acute  


 


Abdominal pain Acute  


 


Bladder cancer Acute  


 


Edema Acute  


 


Gross hematuria Acute  


 


Hydronephrosis, left Acute  


 


MRSA (methicillin resistant Staphylococcus aureus) Acute  ~06/04/17


 


Shortness of breath Acute  


 


Urinary tract infection Acute

## 2018-05-17 RX ADMIN — IPRATROPIUM BROMIDE AND ALBUTEROL SCH: 20; 100 SPRAY, METERED RESPIRATORY (INHALATION) at 11:30

## 2018-05-17 RX ADMIN — VANCOMYCIN HYDROCHLORIDE SCH MG: KIT at 12:54

## 2018-05-17 RX ADMIN — IPRATROPIUM BROMIDE AND ALBUTEROL SCH PUFFS: 20; 100 SPRAY, METERED RESPIRATORY (INHALATION) at 17:03

## 2018-05-17 RX ADMIN — VANCOMYCIN HYDROCHLORIDE SCH MG: KIT at 05:18

## 2018-05-17 RX ADMIN — IPRATROPIUM BROMIDE AND ALBUTEROL SCH: 20; 100 SPRAY, METERED RESPIRATORY (INHALATION) at 23:03

## 2018-05-17 RX ADMIN — VANCOMYCIN HYDROCHLORIDE SCH MG: KIT at 21:47

## 2018-05-17 RX ADMIN — Medication SCH APP: at 09:08

## 2018-05-17 RX ADMIN — Medication SCH APP: at 21:47

## 2018-05-17 RX ADMIN — ENOXAPARIN SODIUM SCH MG: 100 INJECTION SUBCUTANEOUS at 09:08

## 2018-05-17 RX ADMIN — VANCOMYCIN HYDROCHLORIDE SCH MG: KIT at 16:49

## 2018-05-17 RX ADMIN — IPRATROPIUM BROMIDE AND ALBUTEROL SCH PUFFS: 20; 100 SPRAY, METERED RESPIRATORY (INHALATION) at 05:44

## 2018-05-17 NOTE — WOCRNPDOC
WOCRN Advanced Assessment Note





- Skin Integrity Problem, Advanced Assess


  ** Scrotum


Dressing Type: Open to Air


Integumentary Issue Intervention: Lotion/Cream Applied (MAD cream applied by 

nursing)


Skin Integrity Problem Comment: Skin on scrotum and groin area still denuded 

and painful to patient, but improved since previous assessment. Will continue 

with current plan of care using MAD cream. Wound care will sign off on this 

wound. Please reconsult PRN.

## 2018-05-17 NOTE — PCMIDPN
Assessment/Plan: 


Assessment:


C diff colitis.  Patient seems to still have significant number of bowel 

movements.  He is having his 2nd already this morning at 10:00 a.m..  Abdomen 

still painful.  Plan to continue the oral vancomycin and observe for symptom 

improvement.











Plan:


1. Continue oral vancomycin as dosed.


2. Continue look for placement.








 


05/17/18 09:54





Subjective: 





Patient is resting in his hospital bed.  He needs to get assisted to the 

commode for his 2nd bowel movement of the day.  Still having liquid stools.  No 

fevers or chills.  Continues to note abdominal distention and discomfort.  Very 

weak.


Objective: 


Vancomycin p.o. # 6 Vital Signs











Temp Pulse Resp BP Pulse Ox


 


 36.7 C   88   18   146/96 H  95 


 


 05/17/18 07:28  05/17/18 07:28  05/17/18 07:28  05/17/18 07:28  05/17/18 07:28








 Microbiology











 05/15/18 05:55 Urine Culture - Final





 Urine,Catheterized 








 Laboratory Results





 05/14/18 04:15 





 05/14/18 04:15 





 











 05/16/18 05/17/18 05/18/18





 05:59 05:59 05:59


 


Intake Total 3709 3695 


 


Output Total 3350 4670 750


 


Balance 876 -547 -504














- Physical Exam


General Appearance: WD/WN, alert, apparent distress (Mild), toxic (Mild)


Respiratory: lungs clear, normal breath sounds, No respiratory distress


Cardiac/Chest: regular rate, rhythm, No tachycardia


Extremities: non-tender, normal inspection


Abdomen: soft, distended, No non-tender, No mass


Skin: normal color, warm/dry, No rash


Neuro/Psych: alert, normal mood/affect, oriented x 3





ICD10 Worksheet


Patient Problems: 


 Problems











Problem Status Onset


 


Sepsis Acute  


 


Syncope Acute  


 


Abdominal pain Acute  


 


Bladder cancer Acute  


 


Edema Acute  


 


Gross hematuria Acute  


 


Hydronephrosis, left Acute  


 


MRSA (methicillin resistant Staphylococcus aureus) Acute  ~06/04/17


 


Shortness of breath Acute  


 


Urinary tract infection Acute
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
## 2018-05-17 NOTE — HOSPPROG
Hospitalist Progress Note


Assessment/Plan: 





66 yo M w sepsis, complicated uti, cdiff, kathi





# cdiff: po vanco continued for now, diarrhea improving


# sepsis: septic physiology has improved, in setting of c diff and UTI 


# Hypotension: BP improved to the low normal range, good urine output


# ? UTI: in the setting of urostomy in place and abnormal UA on arrival, also 

with e/o sepsis on arrival. Has hx of mrsa UTI in past, started on IV vanc/ctx 

but no urine culture obtained on arrival. Appreciate ID input, IV vanc/CTX 

discontinued on 5/16 and doing well thus far


# KATHI: pre renal, improved


# urostomy: wound care seeing


# Hypoxemia--mild and suspect largely related to immobility/atelectasis, as next


# deconditioning: patient with very limited mobility at this point and will 

need placement after dc, CM involved, pt/ot working with patient


#proph: lmwh


#dispo: inpt, will likely be ready to dc in coming days if snf found








Subjective: no significant overnight events, remains somnolent but has been up 

and walking earlier and eating well


Objective: 


 Vital Signs











Temp Pulse Resp BP Pulse Ox


 


 36.9 C   92   18   142/93 H  94 


 


 05/17/18 15:05  05/17/18 15:05  05/17/18 15:05  05/17/18 15:05  05/17/18 15:05








 Microbiology











 05/15/18 05:55 Urine Culture - Final





 Urine,Catheterized 








 Laboratory Results





 05/14/18 04:15 





 05/17/18 11:00 





 











 05/16/18 05/17/18 05/18/18





 05:59 05:59 05:59


 


Intake Total 3709 3695 


 


Output Total 3350 4670 750


 


Balance 359 -975 -750








 











PT  14.8 SEC (12.0-15.0)   05/12/18  04:30    


 


INR  1.14  (0.83-1.16)   05/12/18  04:30    








awake alert nad


anicteric


op clear


rrr no mrg


cta b


soft nt nd


no cce


warm dry well perfused


oriented approriate








ICD10 Worksheet


Patient Problems: 


 Problems











Problem Status Onset


 


Sepsis Acute  


 


Syncope Acute  


 


Abdominal pain Acute  


 


Bladder cancer Acute  


 


Edema Acute  


 


Gross hematuria Acute  


 


Hydronephrosis, left Acute  


 


MRSA (methicillin resistant Staphylococcus aureus) Acute  ~06/04/17


 


Shortness of breath Acute  


 


Urinary tract infection Acute

## 2018-05-18 LAB — PLATELET # BLD: 274 10^3/UL (ref 150–400)

## 2018-05-18 RX ADMIN — Medication SCH APP: at 08:08

## 2018-05-18 RX ADMIN — VANCOMYCIN HYDROCHLORIDE SCH MG: KIT at 16:01

## 2018-05-18 RX ADMIN — VANCOMYCIN HYDROCHLORIDE SCH MG: KIT at 21:30

## 2018-05-18 RX ADMIN — VANCOMYCIN HYDROCHLORIDE SCH MG: KIT at 05:44

## 2018-05-18 RX ADMIN — Medication SCH APP: at 21:36

## 2018-05-18 RX ADMIN — ACETAMINOPHEN PRN MG: 325 TABLET ORAL at 08:00

## 2018-05-18 RX ADMIN — IPRATROPIUM BROMIDE AND ALBUTEROL SCH PUFFS: 20; 100 SPRAY, METERED RESPIRATORY (INHALATION) at 12:10

## 2018-05-18 RX ADMIN — IPRATROPIUM BROMIDE AND ALBUTEROL SCH: 20; 100 SPRAY, METERED RESPIRATORY (INHALATION) at 22:38

## 2018-05-18 RX ADMIN — VANCOMYCIN HYDROCHLORIDE SCH MG: KIT at 12:08

## 2018-05-18 RX ADMIN — IPRATROPIUM BROMIDE AND ALBUTEROL SCH PUFFS: 20; 100 SPRAY, METERED RESPIRATORY (INHALATION) at 16:46

## 2018-05-18 RX ADMIN — IPRATROPIUM BROMIDE AND ALBUTEROL SCH PUFFS: 20; 100 SPRAY, METERED RESPIRATORY (INHALATION) at 05:32

## 2018-05-18 RX ADMIN — ENOXAPARIN SODIUM SCH MG: 100 INJECTION SUBCUTANEOUS at 08:04

## 2018-05-18 RX ADMIN — ONDANSETRON PRN MG: 2 SOLUTION INTRAMUSCULAR; INTRAVENOUS at 22:31

## 2018-05-18 RX ADMIN — IPRATROPIUM BROMIDE AND ALBUTEROL SCH: 20; 100 SPRAY, METERED RESPIRATORY (INHALATION) at 16:02
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
## 2018-05-18 NOTE — ASMTCMCOM
CM Note

 

CM Note                       

Notes:

5/18/2018 Case Management Note



Phone call from cousin Treva Nugent [phone].  She is unable to financially contribute to pt at 

this time. She has a son with bipolar who lives with her and family visiting from the [place] for the 

summer.  She is not able to have pt come stay at her house.  She reports there are no other family 

members in the area to help patient.  



Case Management d/c poc:  [facility]



Case Management to follow.

 

Date Signed:  05/18/2018 04:48 PM

Electronically Signed By:Sylwia Palmer RN

## 2018-05-18 NOTE — PCMIDPN
Assessment/Plan: 





Cdiff colitis, wbc much improved, never had fever.  Still with significant 

diarrhea and abdominal pain


--continue PO vancomycin


--reassured patient that needs more time





Question of UTI vs wound infection s/p 5 days of ceftriaxone + vanco IV now off 

2 days





meds


vancomycin 125mg QID, #6





Subjective: 





still with large loose BMs, frequent


abdominal cramping 


low appetite


Objective: 


 Vital Signs











Temp Pulse Resp BP Pulse Ox


 


 36.8 C   84   16   146/95 H  90 L


 


 05/18/18 07:40  05/18/18 07:40  05/18/18 07:40  05/18/18 07:40  05/18/18 07:40








 Microbiology











 05/15/18 05:55 Urine Culture - Final





 Urine,Catheterized 








 Laboratory Results





 05/18/18 04:15 





 05/18/18 04:15 





 











 05/17/18 05/18/18 05/19/18





 05:59 05:59 05:59


 


Intake Total 3695 1920 


 


Output Total 4670 7720 750


 


Balance -970 -9850 -750














- Physical Exam


General Appearance: alert, obese


EENT: poor dentition


Respiratory: lungs clear, No accessory muscle use


Cardiac/Chest: regular rate, rhythm


Extremities: No calf tenderness


Abdomen: normal bowel sounds, soft, distended, tender, No rigid


Male Genitalia: other (has urostomy RLQ)


Skin: warm/dry, No rash


Neuro/Psych: alert, normal mood/affect, oriented x 3





- Time Spent With Patient


Time Spent with Patient: greater than 25 minutes


Time Spent with Patient: Greater than 25 minutes spent on this patients care, 

greater than 50% of time spent counseling, educating, and coordinating care 

regarding the above mentioned plan.





ICD10 Worksheet


Patient Problems: 


 Problems











Problem Status Onset


 


Sepsis Acute  


 


Syncope Acute  


 


Abdominal pain Acute  


 


Bladder cancer Acute  


 


Edema Acute  


 


Gross hematuria Acute  


 


Hydronephrosis, left Acute  


 


MRSA (methicillin resistant Staphylococcus aureus) Acute  ~06/04/17


 


Shortness of breath Acute  


 


Urinary tract infection Acute

## 2018-05-18 NOTE — ASMTCMCOM
CM Note

 

CM Note                       

Notes:

5/18/2018 Case Management Note



Met w/pt.  Pt gave permission for case management to contact his brother Amadeo.  Left VM for Amadeo to 


discuss possibly d/c to Amadeo's house with home care.  Pt gave permission to contact son Brian who 

lives in [place], but pt is unable to provide phone number.  Case Management unsuccessful 

locating Brian phone number in pt records.



Spoke with Shefali Carpenter on the phone.  Per Shefali, pt has non triggering PASRR.  Corrected PASRR 

in allscipts.



Sent referrals to nearby facilities in [facility].  Pt regularly stays at 

the [facility] and is resourced through [facility].



Case Management d/c poc:  to be determined.



Case Management to follow.

 

Date Signed:  05/18/2018 12:16 PM

Electronically Signed By:Sylwia Palmer RN

## 2018-05-18 NOTE — HOSPPROG
Hospitalist Progress Note


Assessment/Plan: 





68 yo M w sepsis, complicated uti, cdiff, kathi





# cdiff: po vanco continued for now, diarrhea increased overnight after having 

slowed down, discussed with ID and no real indication to increase vanco dose at 

this time, likely just needs more time


# sepsis: septic physiology has improved, in setting of c diff and UTI 


# Hypotension: BP improved to the low normal range, good urine output


# ? UTI: in the setting of urostomy in place and abnormal UA on arrival, also 

with e/o sepsis on arrival. Has hx of mrsa UTI in past, started on IV vanc/ctx 

but no urine culture obtained on arrival. Appreciate ID input, IV vanc/CTX 

discontinued on 5/16 and doing well thus far


# KATHI: pre renal, improved


# urostomy: wound care seeing


# Hypoxemia--mild and suspect largely related to immobility/atelectasis, as next


# deconditioning: patient with very limited mobility at this point and will 

need placement after dc, CM involved, pt/ot working with patient


#proph: lmwh


#dispo: inpt, will likely be ready to dc in coming days if snf found








Subjective: no significant overnight events patient with increased diarrhea 

since yesterda


Objective: 


 Vital Signs











Temp Pulse Resp BP Pulse Ox


 


 36.6 C   88   18   135/84 H  96 


 


 05/18/18 15:51  05/18/18 19:48  05/18/18 19:48  05/18/18 19:49  05/18/18 19:48








 Laboratory Results





 05/18/18 04:15 





 05/18/18 18:00 





 











 05/17/18 05/18/18 05/19/18





 05:59 05:59 05:59


 


Intake Total 3695 1920 1800


 


Output Total 4670 4850 1475


 


Balance -975 -2930 325








 











PT  14.8 SEC (12.0-15.0)   05/12/18  04:30    


 


INR  1.14  (0.83-1.16)   05/12/18  04:30    








awake alert nad


anicteric


op clear


rrr no mrg


cta b


soft nt nd


no cce


warm dry well perfused


oriented approriate





ICD10 Worksheet


Patient Problems: 


 Problems











Problem Status Onset


 


Sepsis Acute  


 


Syncope Acute  


 


Abdominal pain Acute  


 


Bladder cancer Acute  


 


Edema Acute  


 


Gross hematuria Acute  


 


Hydronephrosis, left Acute  


 


MRSA (methicillin resistant Staphylococcus aureus) Acute  ~06/04/17


 


Shortness of breath Acute  


 


Urinary tract infection Acute

## 2018-05-19 RX ADMIN — VANCOMYCIN HYDROCHLORIDE SCH MG: KIT at 21:11

## 2018-05-19 RX ADMIN — IPRATROPIUM BROMIDE AND ALBUTEROL SCH PUFFS: 20; 100 SPRAY, METERED RESPIRATORY (INHALATION) at 21:42

## 2018-05-19 RX ADMIN — IPRATROPIUM BROMIDE AND ALBUTEROL SCH: 20; 100 SPRAY, METERED RESPIRATORY (INHALATION) at 17:07

## 2018-05-19 RX ADMIN — Medication SCH APP: at 08:55

## 2018-05-19 RX ADMIN — VANCOMYCIN HYDROCHLORIDE SCH MG: KIT at 15:51

## 2018-05-19 RX ADMIN — VANCOMYCIN HYDROCHLORIDE SCH MG: KIT at 06:04

## 2018-05-19 RX ADMIN — Medication SCH APP: at 21:11

## 2018-05-19 RX ADMIN — IPRATROPIUM BROMIDE AND ALBUTEROL SCH PUFFS: 20; 100 SPRAY, METERED RESPIRATORY (INHALATION) at 10:59

## 2018-05-19 RX ADMIN — ENOXAPARIN SODIUM SCH MG: 100 INJECTION SUBCUTANEOUS at 08:55

## 2018-05-19 RX ADMIN — ONDANSETRON PRN MG: 2 SOLUTION INTRAMUSCULAR; INTRAVENOUS at 06:04

## 2018-05-19 RX ADMIN — VANCOMYCIN HYDROCHLORIDE SCH MG: KIT at 11:20

## 2018-05-19 RX ADMIN — ONDANSETRON PRN MG: 2 SOLUTION INTRAMUSCULAR; INTRAVENOUS at 18:36

## 2018-05-19 RX ADMIN — IPRATROPIUM BROMIDE AND ALBUTEROL SCH PUFFS: 20; 100 SPRAY, METERED RESPIRATORY (INHALATION) at 06:14

## 2018-05-19 RX ADMIN — ONDANSETRON PRN MG: 2 SOLUTION INTRAMUSCULAR; INTRAVENOUS at 11:15

## 2018-05-19 NOTE — HOSPPROG
Hospitalist Progress Note


Assessment/Plan: 





Subjective


Follow-up on C diff colitis and suspected urinary infection.


Patient completed a course of ceftriaxone and vancomycin.  He was subsequently 

diagnosed with C diff colitis and was started on oral vancomycin.  He continues 

to have loose stools.  When I went in to see him today he asked me to call his 

nurse as he just had a loose bowel movement.  I reviewed his case with Dr. Mora with Infectious Disease today as well.





Objective 


Vital Signs


Temperature 36.7 blood pressure 102/66 heart rate 95 respirations 16 satting 92

% on 2 L nasal cannula


Physical exam


General-patient mildly uncomfortable secondary to she is having a bowel 

movement in his bed but he was awake alert conversant.


Heart-irregularly irregular


Lungs-clear on auscultation


Abdomen-nondistended slightly hyperactive bowel sounds


-no Reid catheter in place


Extremities-no significant edema





Labs as detailed below





Assessment and plan


1. Sepsis-this appears resolved at the current time.


2. Possible urinary source of infection-patient has completed a course of 

ceftriaxone and vancomycin.  He has had a history of MRSA in his urine.  A 

continue to monitor clinically for and a fevers or worsening pain.


3. Clostridium difficile colitis-appreciate Infectious disease's assistance on 

the case.  Continue with current oral vancomycin.  


4. Acute kidney injury-resolved.


5. Acute hypoxic respiratory failure-continue current supplemental oxygen.  

Possibly atelectasis related.  Wean as able.


6. Hypomagnesemia-replace and recheck tomorrow morning.


7. Atrial fibrillation-paroxysmal.  not currently on any anticoagulation or anti

-platelet therapy.  Heart rate soft controlled.


8. Hypertension-patient is on lisinopril at home.  I recommend that we continue 

to hold as blood pressures appear low normal at the current time.


9.  Bladder cancer-patient is status post radical cysto prostatectomy with 

urostomy placement.


10. DVT prophylaxis-patient is on Lovenox.


11. Disposition-will see how he does with PT and OT.  May need short-term rehab 

placement  


Objective: 


 Vital Signs











Temp Pulse Resp BP Pulse Ox


 


 36.7 C   92   18   102/66   94 


 


 05/19/18 07:21  05/19/18 11:01  05/19/18 11:01  05/19/18 07:21  05/19/18 11:01








 Laboratory Results





 05/18/18 04:15 





 05/19/18 06:00 





 











 05/18/18 05/19/18 05/20/18





 05:59 05:59 05:59


 


Intake Total 1920 2800 1120


 


Output Total 4850 4327 1250


 


Balance -2930 -1525 -130








 











PT  14.8 SEC (12.0-15.0)   05/12/18  04:30    


 


INR  1.14  (0.83-1.16)   05/12/18  04:30    














ICD10 Worksheet


Patient Problems: 


 Problems











Problem Status Onset


 


Sepsis Acute  


 


Syncope Acute  


 


Abdominal pain Acute  


 


Bladder cancer Acute  


 


Edema Acute  


 


Gross hematuria Acute  


 


Hydronephrosis, left Acute  


 


MRSA (methicillin resistant Staphylococcus aureus) Acute  ~06/04/17


 


Shortness of breath Acute  


 


Urinary tract infection Acute

## 2018-05-19 NOTE — PCMIDPN
Assessment/Plan: 





Cdiff colitis, wbc normal yesterday, never had fever.  Still with significant 

diarrhea and abdominal pain


--continue PO vancomycin


--again, reassured patient that needs more time


--check labs tomorrow AM





Question of UTI vs wound infection s/p 5 days of ceftriaxone + vanco IV now off 

3 days





meds


vancomycin 125mg QID, #7





Subjective: 





Still frustrated with persistent diarrhea


Objective: 


 Vital Signs











Temp Pulse Resp BP Pulse Ox


 


 36.7 C   92   18   102/66   94 


 


 05/19/18 07:21  05/19/18 11:01  05/19/18 11:01  05/19/18 07:21  05/19/18 11:01








 Laboratory Results





 05/18/18 04:15 





 05/19/18 06:00 





 











 05/18/18 05/19/18 05/20/18





 05:59 05:59 05:59


 


Intake Total 1920 2800 1120


 


Output Total 4850 4325 


 


Balance -2930 -1525 1120














- Physical Exam


General Appearance: alert, no apparent distress


EENT: poor dentition


Respiratory: lungs clear, No accessory muscle use


Cardiac/Chest: regular rate, rhythm


Abdomen: soft, distended (Mild), other (Right lower quadrant urostomy, healthy-

appearing), No normal bowel sounds (Hyperactive bowel sounds), No peritoneal 

signs


Skin: warm/dry, No diaphoresis, No rash


Neuro/Psych: alert, normal mood/affect, oriented x 3





- Time Spent With Patient


Time Spent with Patient: greater than 25 minutes


Time Spent with Patient: Greater than 25 minutes spent on this patients care, 

greater than 50% of time spent counseling, educating, and coordinating care 

regarding the above mentioned plan.





ICD10 Worksheet


Patient Problems: 


 Problems











Problem Status Onset


 


Sepsis Acute  


 


Syncope Acute  


 


Abdominal pain Acute  


 


Bladder cancer Acute  


 


Edema Acute  


 


Gross hematuria Acute  


 


Hydronephrosis, left Acute  


 


MRSA (methicillin resistant Staphylococcus aureus) Acute  ~06/04/17


 


Shortness of breath Acute  


 


Urinary tract infection Acute

## 2018-05-20 LAB — PLATELET # BLD: 248 10^3/UL (ref 150–400)

## 2018-05-20 RX ADMIN — SODIUM CHLORIDE SCH MLS: 900 INJECTION, SOLUTION INTRAVENOUS at 23:29

## 2018-05-20 RX ADMIN — IPRATROPIUM BROMIDE AND ALBUTEROL SCH PUFFS: 20; 100 SPRAY, METERED RESPIRATORY (INHALATION) at 16:09

## 2018-05-20 RX ADMIN — Medication SCH APP: at 21:02

## 2018-05-20 RX ADMIN — Medication SCH APP: at 08:42

## 2018-05-20 RX ADMIN — VANCOMYCIN HYDROCHLORIDE SCH MG: KIT at 05:54

## 2018-05-20 RX ADMIN — IPRATROPIUM BROMIDE AND ALBUTEROL SCH PUFFS: 20; 100 SPRAY, METERED RESPIRATORY (INHALATION) at 06:19

## 2018-05-20 RX ADMIN — VANCOMYCIN HYDROCHLORIDE SCH MG: KIT at 21:02

## 2018-05-20 RX ADMIN — HYDROCODONE BITARTRATE AND ACETAMINOPHEN PRN TAB: 5; 325 TABLET ORAL at 03:28

## 2018-05-20 RX ADMIN — ENOXAPARIN SODIUM SCH MG: 100 INJECTION SUBCUTANEOUS at 08:40

## 2018-05-20 RX ADMIN — VANCOMYCIN HYDROCHLORIDE SCH MG: KIT at 11:18

## 2018-05-20 RX ADMIN — VANCOMYCIN HYDROCHLORIDE SCH MG: KIT at 16:47

## 2018-05-20 RX ADMIN — IPRATROPIUM BROMIDE AND ALBUTEROL SCH: 20; 100 SPRAY, METERED RESPIRATORY (INHALATION) at 20:04

## 2018-05-20 RX ADMIN — IPRATROPIUM BROMIDE AND ALBUTEROL SCH PUFFS: 20; 100 SPRAY, METERED RESPIRATORY (INHALATION) at 11:06

## 2018-05-20 NOTE — HOSPPROG
Hospitalist Progress Note


Assessment/Plan: 





Subjective


Follow-up on C diff colitis.


Patient states that he is feeling better today and feels that the frequency of 

his bowel movements has decreased.  No complaints of any worsening abdominal 

pain or bloating.  I did confirm that it is recommended he is on a 4 L of 

oxygen continuous secondary to COPD.  I did discuss his case with Dr. Mora 

with Infectious Disease.  The patient is homeless.





Objective


Vital signs


Temperature 36.8 blood pressure 135/82 heart rate 87 respirations 16 satting 93

% on 4 L nasal cannula


Physical exam


General-patient appears more comfortable today.  He is awake alert and 

conversant no acute distress


Cardiac-regular rate and rhythm.  No murmurs noted.


Lungs-Clear to auscultation.  Normal respiratory effort.


Abdomen-nondistended.  Again slightly hyperactive bowel sounds today.


-patient is a Reid catheter placed at his urostomy bag which has clear urine.


Extremities-no significant edema





Labs as detailed below





Assessment and plan


1. C diff colitis-clinically I think patient is slightly better today as 

compared to yesterday's exam.  Continue to course of vancomycin.  Isolation 

precautions are in place.  Appreciated on Infectious disease's assistance on 

the case.


2. Sepsis with suspected urinary source-patient has completed a course of 

ceftriaxone and vancomycin.  He has had a history of MRSA in his urine.  

Monitor closely for any fevers or worsening pain.


3.  Hypocalcemia replacement ordered for today.


4. Hypomagnesemia-improved today with at 1.6 which is within normal limits.


5. Atrial fibrillation-paroxysmal.  Patient was in normal sinus rhythm on his 

initial presenting EKG.  He is not on any current rate limiting agents and is 

not on any anticoagulation at this time.


6. Hypertension holding antihypertensives at this point time.


7. Chronic hypoxic respiratory failure secondary to COPD-continue Combivent 

inhaler.  Patient is on 4 L at baseline.


8. Bladder cancer -patient is status post radical cysto prostatectomy and 

urostomy placement.


9. Depression and anxiety-patient is currently on Cymbalta.  Continue.


10. DVT prophylaxis-patient is currently on Lovenox.


11. Disposition-continue work with PT and OT.  Patient is reportedly homeless 

and may need placement depend on how he is doing in the coming days.  





Objective: 


 Vital Signs











Temp Pulse Resp BP Pulse Ox


 


 36.8 C   87   16   135/82 H  93 


 


 05/20/18 07:44  05/20/18 07:44  05/20/18 07:44  05/20/18 07:44  05/20/18 07:44








 Laboratory Results





 05/20/18 03:50 





 05/20/18 03:50 





 











 05/19/18 05/20/18 05/21/18





 05:59 05:59 05:59


 


Intake Total 2800 3300 


 


Output Total 4325 3200 400


 


Balance -1525 100 -400








 











PT  14.8 SEC (12.0-15.0)   05/12/18  04:30    


 


INR  1.14  (0.83-1.16)   05/12/18  04:30    














ICD10 Worksheet


Patient Problems: 


 Problems











Problem Status Onset


 


Sepsis Acute  


 


Syncope Acute  


 


Abdominal pain Acute  


 


Bladder cancer Acute  


 


Edema Acute  


 


Gross hematuria Acute  


 


Hydronephrosis, left Acute  


 


MRSA (methicillin resistant Staphylococcus aureus) Acute  ~06/04/17


 


Shortness of breath Acute  


 


Urinary tract infection Acute

## 2018-05-20 NOTE — PCMIDPN
Assessment/Plan: 





Cdiff colitis, wbc normal today.  Stool more formed today and less abdominal 

pain


--continue PO vancomycin for 14 days total


--call ID for additional questions





Question of UTI vs wound infection s/p 5 days of ceftriaxone + vanco IV now off 

multiple days





meds


vancomycin 125mg QID, #8











Subjective: 





patient still bothered by abdominal cramping


Objective: 


 Vital Signs











Temp Pulse Resp BP Pulse Ox


 


 36.8 C   87   16   135/82 H  93 


 


 05/20/18 07:44  05/20/18 07:44  05/20/18 07:44  05/20/18 07:44  05/20/18 07:44








 Laboratory Results





 05/20/18 03:50 





 05/20/18 03:50 





 











 05/19/18 05/20/18 05/21/18





 05:59 05:59 05:59


 


Intake Total 2800 3300 


 


Output Total 4325 3200 400


 


Balance -1525 100 -400








General Appearance: alert, disheveled, no apparent distress


EENT: poor dentition


Respiratory: lungs clear, No accessory muscle use


Cardiac/Chest: regular rate, rhythm


Abdomen: soft, less distended , Right lower quadrant urostomy, healthy-appearing

, normal bowel sounds , No peritoneal signs


Skin: warm/dry, No diaphoresis, No rash


Neuro/Psych: alert, normal mood/affect, oriented x 3








ICD10 Worksheet


Patient Problems: 


 Problems











Problem Status Onset


 


Sepsis Acute  


 


Syncope Acute  


 


Abdominal pain Acute  


 


Bladder cancer Acute  


 


Edema Acute  


 


Gross hematuria Acute  


 


Hydronephrosis, left Acute  


 


MRSA (methicillin resistant Staphylococcus aureus) Acute  ~06/04/17


 


Shortness of breath Acute  


 


Urinary tract infection Acute

## 2018-05-21 LAB — PLATELET # BLD: 269 10^3/UL (ref 150–400)

## 2018-05-21 RX ADMIN — VANCOMYCIN HYDROCHLORIDE SCH MG: KIT at 06:01

## 2018-05-21 RX ADMIN — IPRATROPIUM BROMIDE AND ALBUTEROL SCH PUFFS: 20; 100 SPRAY, METERED RESPIRATORY (INHALATION) at 21:49

## 2018-05-21 RX ADMIN — VANCOMYCIN HYDROCHLORIDE SCH MG: KIT at 12:46

## 2018-05-21 RX ADMIN — IPRATROPIUM BROMIDE AND ALBUTEROL SCH PUFFS: 20; 100 SPRAY, METERED RESPIRATORY (INHALATION) at 06:01

## 2018-05-21 RX ADMIN — Medication SCH APP: at 20:16

## 2018-05-21 RX ADMIN — Medication SCH APP: at 08:56

## 2018-05-21 RX ADMIN — ENOXAPARIN SODIUM SCH MG: 100 INJECTION SUBCUTANEOUS at 08:47

## 2018-05-21 RX ADMIN — IPRATROPIUM BROMIDE AND ALBUTEROL SCH PUFFS: 20; 100 SPRAY, METERED RESPIRATORY (INHALATION) at 10:21

## 2018-05-21 RX ADMIN — SODIUM CHLORIDE SCH MLS: 900 INJECTION, SOLUTION INTRAVENOUS at 20:13

## 2018-05-21 RX ADMIN — VANCOMYCIN HYDROCHLORIDE SCH MG: KIT at 16:36

## 2018-05-21 RX ADMIN — VANCOMYCIN HYDROCHLORIDE SCH MG: KIT at 20:14

## 2018-05-21 RX ADMIN — IPRATROPIUM BROMIDE AND ALBUTEROL SCH PUFFS: 20; 100 SPRAY, METERED RESPIRATORY (INHALATION) at 17:41

## 2018-05-21 RX ADMIN — SODIUM CHLORIDE SCH MLS: 900 INJECTION, SOLUTION INTRAVENOUS at 08:48

## 2018-05-21 NOTE — HOSPPROG
Hospitalist Progress Note


Assessment/Plan: 





Subjective


Follow-up on C diff colitis


Patient states that his bowels seem to have a little more consistency now as 

compared to days prior.  No acute events overnight.  No new complaints today.





Objective


Vital signs


Temperature 36.8 blood pressure 140/82 heart rate 72 respirations 16 satting 96

% on room air


Physical exam


General-patient appears more comfortable today than in days prior.  He is awake 

alert conversant no acute distress.  Trying to have some lunch this afternoon.


Cardiac-regular rate and rhythm


Lungs-Clear to auscultation with normal respiratory effort.


Abdomen-nondistended and nontender.  Slightly hyperactive bowel sounds.


-Reid catheter placed at his urostomy bag which shows clear urine


Extremities-no significant pitting edema appreciated





Labs as detailed below





Assessment and plan


1. C diff colitis-id the patient has continued to improve slowly each day.  

Continue the course of vancomycin.  Isolation precautions are in place I 

appreciate Infectious disease's assistance on the case.


2. Sepsis with suspected urinary source-this was the initial indication for 

admission to the hospital.  Patient has completed a course of ceftriaxone and 

vancomycin.  He has had a history of MRSA in his urine.  Monitor closely for 

any fevers or worsening pain.


3. Hypocalcemia-improved today high ionized calcium is within normal limits.  

Will continue with checking electrolytes in the morning for now as he has had 

low magnesium as well.


4. Atrial fibrillation-paroxysmal.  Patient was in normal sinus rhythm on his 

initial presenting EKG and has been in normal sinus rhythm on my exam is.  He 

is not on any rate limiting agents and he is not on any anticoagulation at this 

time.


5. Hypertension-holding antihypertensives at this time however his blood 

pressure seemed to be trending up and we may need to resume.


6. Chronic hypoxic respiratory failure secondary to COPD-patient is at his 

baseline oxygen need to 4 L continuous.  Continue Combivent inhaler.  


7. Bladder cancer-patient is status post radical cysto prostatectomy with 

urostomy placement.


8. Depression-continue current Cymbalta.


9. DVT prophylaxis-patient is currently on Lovenox.


10. Disposition-the patient is homeless.  We are looking at placement in a 

skilled nursing facility.  





Objective: 


 Vital Signs











Temp Pulse Resp BP Pulse Ox


 


 36.9 C   108 H  18   145/94 H  95 


 


 05/21/18 15:26  05/21/18 15:26  05/21/18 15:26  05/21/18 15:26  05/21/18 15:26








 Laboratory Results





 05/21/18 04:00 





 05/21/18 04:00 





 











 05/20/18 05/21/18 05/22/18





 05:59 05:59 05:59


 


Intake Total 3300 1540 


 


Output Total 3200 2300 2650


 


Balance 100 -760 -2650








 











PT  14.8 SEC (12.0-15.0)   05/12/18  04:30    


 


INR  1.14  (0.83-1.16)   05/12/18  04:30    














ICD10 Worksheet


Patient Problems: 


 Problems











Problem Status Onset


 


Sepsis Acute  


 


Syncope Acute  


 


Abdominal pain Acute  


 


Bladder cancer Acute  


 


Edema Acute  


 


Gross hematuria Acute  


 


Hydronephrosis, left Acute  


 


MRSA (methicillin resistant Staphylococcus aureus) Acute  ~06/04/17


 


Shortness of breath Acute  


 


Urinary tract infection Acute

## 2018-05-22 LAB — PLATELET # BLD: 291 10^3/UL (ref 150–400)

## 2018-05-22 RX ADMIN — Medication SCH APP: at 21:56

## 2018-05-22 RX ADMIN — BUDESONIDE AND FORMOTEROL FUMARATE DIHYDRATE SCH PUFFS: 80; 4.5 AEROSOL RESPIRATORY (INHALATION) at 17:22

## 2018-05-22 RX ADMIN — IPRATROPIUM BROMIDE AND ALBUTEROL SCH PUFFS: 20; 100 SPRAY, METERED RESPIRATORY (INHALATION) at 11:26

## 2018-05-22 RX ADMIN — VANCOMYCIN HYDROCHLORIDE SCH MG: KIT at 21:54

## 2018-05-22 RX ADMIN — IPRATROPIUM BROMIDE AND ALBUTEROL SCH: 20; 100 SPRAY, METERED RESPIRATORY (INHALATION) at 05:08

## 2018-05-22 RX ADMIN — Medication SCH APP: at 08:38

## 2018-05-22 RX ADMIN — IPRATROPIUM BROMIDE AND ALBUTEROL SCH PUFFS: 20; 100 SPRAY, METERED RESPIRATORY (INHALATION) at 20:18

## 2018-05-22 RX ADMIN — VANCOMYCIN HYDROCHLORIDE SCH MG: KIT at 05:51

## 2018-05-22 RX ADMIN — IPRATROPIUM BROMIDE AND ALBUTEROL SCH PUFFS: 20; 100 SPRAY, METERED RESPIRATORY (INHALATION) at 17:22

## 2018-05-22 RX ADMIN — BUDESONIDE AND FORMOTEROL FUMARATE DIHYDRATE SCH: 80; 4.5 AEROSOL RESPIRATORY (INHALATION) at 19:40

## 2018-05-22 RX ADMIN — VANCOMYCIN HYDROCHLORIDE SCH MG: KIT at 12:28

## 2018-05-22 RX ADMIN — VANCOMYCIN HYDROCHLORIDE SCH MG: KIT at 17:08

## 2018-05-22 RX ADMIN — ENOXAPARIN SODIUM SCH MG: 100 INJECTION SUBCUTANEOUS at 08:34

## 2018-05-22 NOTE — ASMTCMCOM
CM Note

 

CM Note                       

Notes:

DC plan still to go to [facility] when medically ready. Updated Deepthi at [facility].CM will 


follow.

 

Date Signed:  05/22/2018 01:20 PM

Electronically Signed By:Ronda Espinal RN
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
## 2018-05-23 RX ADMIN — VANCOMYCIN HYDROCHLORIDE SCH MG: KIT at 05:00

## 2018-05-23 RX ADMIN — IPRATROPIUM BROMIDE AND ALBUTEROL SCH: 20; 100 SPRAY, METERED RESPIRATORY (INHALATION) at 06:12

## 2018-05-23 RX ADMIN — ENOXAPARIN SODIUM SCH MG: 100 INJECTION SUBCUTANEOUS at 09:16

## 2018-05-23 RX ADMIN — IPRATROPIUM BROMIDE AND ALBUTEROL SCH PUFFS: 20; 100 SPRAY, METERED RESPIRATORY (INHALATION) at 10:28

## 2018-05-23 RX ADMIN — VANCOMYCIN HYDROCHLORIDE SCH MG: KIT at 21:19

## 2018-05-23 RX ADMIN — Medication SCH APP: at 09:02

## 2018-05-23 RX ADMIN — BUDESONIDE AND FORMOTEROL FUMARATE DIHYDRATE SCH PUFFS: 80; 4.5 AEROSOL RESPIRATORY (INHALATION) at 20:40

## 2018-05-23 RX ADMIN — BUDESONIDE AND FORMOTEROL FUMARATE DIHYDRATE SCH PUFFS: 80; 4.5 AEROSOL RESPIRATORY (INHALATION) at 09:17

## 2018-05-23 RX ADMIN — Medication SCH APP: at 21:19

## 2018-05-23 RX ADMIN — HYDROCODONE BITARTRATE AND ACETAMINOPHEN PRN TAB: 5; 325 TABLET ORAL at 02:19

## 2018-05-23 RX ADMIN — IPRATROPIUM BROMIDE AND ALBUTEROL SCH PUFFS: 20; 100 SPRAY, METERED RESPIRATORY (INHALATION) at 16:08

## 2018-05-23 RX ADMIN — HYDROCODONE BITARTRATE AND ACETAMINOPHEN PRN TAB: 5; 325 TABLET ORAL at 09:00

## 2018-05-23 RX ADMIN — VANCOMYCIN HYDROCHLORIDE SCH MG: KIT at 12:49

## 2018-05-23 RX ADMIN — IPRATROPIUM BROMIDE AND ALBUTEROL SCH PUFFS: 20; 100 SPRAY, METERED RESPIRATORY (INHALATION) at 20:40

## 2018-05-23 RX ADMIN — VANCOMYCIN HYDROCHLORIDE SCH MG: KIT at 14:34

## 2018-05-23 NOTE — GDS
[f 
rep st]



                                                             DISCHARGE SUMMARY







ADDENDUM TO PREVIOUSLY DICTATED REPORT



The patient's discharge was delayed by 1 day as the accepting skilled nursing 
facility changed their mind at the last minute. There has been no change in the 
patient's status over the past 24 hours. All discharge medications and 
discharge instructions are unchanged as outlined in the original discharge 
summary report from 05/23/2018 below.



Job #[id number]

D:  05/24/2018 1649   T:  05/24/2018 1912 [facility]







ORIGINAL REPORT



DISCHARGE DIAGNOSES:  

1.  Sepsis secondary to suspected urinary source, status post intravenous 
ceftriaxone and intravenous vancomycin.

2.  History of methicillin-resistant Staphylococcus aureus in his urine.

3.  Clostridium difficile colitis.

4.  Chronic hypoxemic respiratory failure, on 3 L of oxygen.

5.  Hypocalcemia, resolved.

6.  Paroxysmal atrial fibrillation.

7.  Hypertension.

8.  Bladder cancer, status post radical cystoprostatectomy with urostomy.

9.  COPD. Metabolic alkalosis, likely secondary to chronic CO2 retention.

10.  Depression.

11.  Homelessness.



CONSULTATIONS:  Dr. Cassie Salazar, Infectious Disease.



HISTORY:  For details, please see History and Physical dated May 11, 2018. In 
brief, the patient is a 67-year-old male with a history of oxygen-dependent COPD
, atrial fibrillation, hypertension, and bladder cancer, status post radical 
cystoprostatectomy with urostomy, who presented to the emergency department 
after collapsing in line at [facility]. He was found to have 
malodorous urine draining from his urostomy and met criteria for sepsis. He was 
admitted to the hospital for further management.



HOSPITAL COURSE:  The patient received IV fluid bolus and was started on 
vancomycin and ceftriaxone given the history of MRSA in his urine. He completed 
his antibiotic course while here. He also presented with diarrhea and was C 
diff positive. He was started on oral vancomycin with plans to complete 14 days 
of therapy. On the day of discharge, he was having more formed stools. His 
abdominal pain has improved. He was maintained on his baseline oxygen 
requirement of around 3 L/minute and was started on inhalers for his COPD, 
which will be continued at discharge. Due to overall deconditioning, therapy 
services recommended rehab for ongoing strengthening.



DISPOSITION:  Patient is discharged to skilled nursing facility for rehab in 
stable condition.



FOLLOWUP:  

1.  Dr. Rodo Gage, primary care.

2.  [facility].



DISCHARGE MEDICATIONS:  Please see [facility] for completed outpatient medication 
list. New medications on discharge include vancomycin oral 125 mg p.o. q.i.d., #
16, no refills; Symbicort 80/4.5 mcg 2 puffs inhaled b.i.d. He will continue 
his Combivent q.i.d., as well as Cymbalta and lisinopril. Norco was 
discontinued.





Job #:  [id number]

MTDD
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
## 2018-05-23 NOTE — PDIAF
- Diagnosis


Diagnosis: C diff, COPD


Code Status: Full Code





- Medication Management


Discharge Medications: 


 Medications to Continue on Transfer





DULoxetine [Cymbalta 30 MG (*)] 30 mg PO BID 05/11/18 [Last Taken Unknown]


Lisinopril [Zestril 10 mg (*)] 10 mg PO DAILY 05/11/18 [Last Taken Unknown]


Budesonide/Formoterol 80/4.5 [Symbicort 80-4.5 Mcg Inhaler] 2 puffs IH BID #1 

mdi 05/23/18 [Last Taken Unknown]


Ipratropium/Albuterol [Combivent Respimat Inhal Spray(*)] 1 inh IH QID #120 mdi 

05/23/18 [Last Taken Unknown]


Vancomycin [Vancocin Oral Liquid] 125 mg PO QID #16 udl 05/23/18 [Last Taken 

Unknown]








Discharge Medications: Refer to the Discharge Home Medication list for PRN 

reason.


PICC Care - Routine: N/A





- Orders


Services needed: Registered Nurse, Physical Therapy, Occupational Therapy


Isolation Type: CDIFF Isolation, Contact Isolation


Oxygen: 3 LPM


Diet Recommendation: no restrictions on diet





- Follow Up Care


Current Providers and Referrals: 


Patient,NotPresent [Unknown] - As per Instructions


JUAN M ACUNA [Primary Care Provider] -

## 2018-05-23 NOTE — WOCRNPDOC
WOCRN Advanced Assessment Note





- Urostomy Assessment, Advanced


  ** Right Abdomen


Urostomy Appliance Intact: No


Urostomy Appliance Currently in Use: Two Piece Flat, 2 1/4, Cut to Fit


Stoma Color: Red


Stoma Turgor: Moist, Shiny


Stoma Shape: Oval


Stoma Height: Protruding


Mucocutaneus Junction: Intact


Urostomy Effluent: Urine


Urostomy Details: Ileal Conduit


Peristomal Skin: Intact (but scarred )


Urostomy Comment/Treatment Details: Ostomy appliance leaking. Ostomy Rn created 

a new template and changed appliance. Rosita stomal skin with scarring from 

previous areas of breakdown and moisture issues from urine on the skin. No open 

areas at this time. Standard ostomy supplies from clean supply room work well 

for this stoma. Ostomy RN is not needed and will sign off.

## 2018-05-23 NOTE — HOSPPROG
Hospitalist Progress Note


Assessment/Plan: 





C diff colitis- cont vanc, day 11/14





Sepsis with suspected urinary source- H/O MRSA in his urine.  S/P Ceftriaxone 

and IV Vanc.





Hypocalcemia-improved





Atrial fibrillation-paroxysmal.  Remains in sinus by auscultation.  He is 

neither AV irvin blockers nor anti-coagulation.





Hypertension-holding antihypertensives at this time


   -resume as indicated





Chronic hypoxic respiratory failure secondary to COPD- patient is at his 

baseline oxygen need to 3-4 L continuous.  


   -cont duonebs


   -addedsymbicort





Metabolic alkalosis - suspect CO2 retention.  If mentation changes, will check 

ABG


   -cont to trend





Bladder cancer-patient is status post radical cysto prostatectomy with urostomy 

placement.


   -excellent uop, d/c IVF's





Depression-continue current Cymbalta.





DVT prophylaxis- Lovenox.





Disposition-the patient is homeless.  We are looking at placement in a skilled 

nursing facility, he was to d/c to [facility] today, but they called it off 

at the last minute.  CM working on alternative options.  


Subjective: Pt feels better.  Stools are more formed.  No abdominal pain.  

Breathing is at baseline.  Walking in halls with PT with walker.  No complaints.


Objective: 


 Vital Signs











Temp Pulse Resp BP Pulse Ox


 


 37.0 C   99   18   130/81 H  96 


 


 05/23/18 19:33  05/23/18 19:33  05/23/18 19:33  05/23/18 19:33  05/23/18 19:33








 Laboratory Results





 05/22/18 05:50 





 05/23/18 04:50 





 











 05/22/18 05/23/18 05/24/18





 05:59 05:59 05:59


 


Intake Total 2350 1860 1200


 


Output Total 6150 3600 1100


 


Balance -3800 -1740 100








 











PT  14.8 SEC (12.0-15.0)   05/12/18  04:30    


 


INR  1.14  (0.83-1.16)   05/12/18  04:30    














- Physical Exam


Constitutional: no apparent distress


Eyes: PERRL


Ears, Nose, Mouth, Throat: moist mucous membranes


Cardiovascular: regular rate and rhythym


Respiratory: no respiratory distress, reduced air movement


Gastrointestinal: normoactive bowel sounds, soft, non-tender abdomen


Skin: warm


Musculoskeletal: full muscle strength


Neurologic: AAOx3


Psychiatric: interacting appropriately





ICD10 Worksheet


Patient Problems: 


 Problems











Problem Status Onset


 


Sepsis Acute  


 


Syncope Acute  


 


Abdominal pain Acute  


 


Bladder cancer Acute  


 


Edema Acute  


 


Gross hematuria Acute  


 


Hydronephrosis, left Acute  


 


MRSA (methicillin resistant Staphylococcus aureus) Acute  ~06/04/17


 


Shortness of breath Acute  


 


Urinary tract infection Acute

## 2018-05-24 VITALS — DIASTOLIC BLOOD PRESSURE: 85 MMHG | SYSTOLIC BLOOD PRESSURE: 121 MMHG

## 2018-05-24 RX ADMIN — VANCOMYCIN HYDROCHLORIDE SCH MG: KIT at 16:17

## 2018-05-24 RX ADMIN — VANCOMYCIN HYDROCHLORIDE SCH MG: KIT at 12:42

## 2018-05-24 RX ADMIN — IPRATROPIUM BROMIDE AND ALBUTEROL SCH PUFFS: 20; 100 SPRAY, METERED RESPIRATORY (INHALATION) at 16:21

## 2018-05-24 RX ADMIN — BUDESONIDE AND FORMOTEROL FUMARATE DIHYDRATE SCH PUFFS: 80; 4.5 AEROSOL RESPIRATORY (INHALATION) at 08:59

## 2018-05-24 RX ADMIN — IPRATROPIUM BROMIDE AND ALBUTEROL SCH PUFFS: 20; 100 SPRAY, METERED RESPIRATORY (INHALATION) at 05:48

## 2018-05-24 RX ADMIN — Medication SCH APP: at 10:34

## 2018-05-24 RX ADMIN — VANCOMYCIN HYDROCHLORIDE SCH MG: KIT at 05:49

## 2018-05-24 RX ADMIN — IPRATROPIUM BROMIDE AND ALBUTEROL SCH PUFFS: 20; 100 SPRAY, METERED RESPIRATORY (INHALATION) at 11:27

## 2018-05-24 RX ADMIN — ENOXAPARIN SODIUM SCH MG: 100 INJECTION SUBCUTANEOUS at 10:33

## 2018-05-24 NOTE — ASMTLACE
LACE

 

Length of stay for            Answers:  7-13 days                             

current admission                                                             

Acuity / Level of             Answers:  Yes                                   

Care: Did the patient                                                         

have an inpatient                                                             

admission?                                                                    

Comorbidities - select        Answers:  Any tumor (including                  

all that apply                          lymphoma or leukemia)                 

                                        Other                         Notes:  h/o 

                                                                              afib, HTN, diverticulos
is,

                                                                               urostomy

# of Emergency department     Answers:  5-8                                   

visits in the last 6                                                          

months                                                                        

Social determinants           Answers:  History of substance                  

                                        abuse (ETOH, street                   

                                        drugs, prescription                   

                                        drugs, etc.)                          

                                        Homelessness                          

                                        (street, shelter)                     

                                        History of trauma                     

                                        (PTSD, child                          

                                        abuse, domestic                       

                                        violence, etc.)                       

                                        Lack of community                     

                                        resources and/or lack of              

                                        social support (no                    

                                        pcp, lives                            

                                        alone, transportation, brian            

                                        d)                                    

Score: 28

 

Date Signed:  05/24/2018 03:45 PM

Electronically Signed By:Colleen Marks RN

## 2018-05-24 NOTE — PDIAF
- Diagnosis


Diagnosis: C diff, COPD


Code Status: Full Code





- Medication Management


Discharge Medications: 


 Medications to Continue on Transfer





DULoxetine [Cymbalta 30 MG (*)] 30 mg PO BID 05/11/18 [Last Taken Unknown]


Budesonide/Formoterol 80/4.5 [Symbicort 80-4.5 Mcg Inhaler] 2 puffs IH BID #1 

mdi 05/23/18 [Last Taken Unknown]


Ipratropium/Albuterol [Combivent Respimat Inhal Spray(*)] 1 inh IH QID #120 mdi 

05/23/18 [Last Taken Unknown]


Vancomycin [Vancocin Oral Liquid] 125 mg PO QID #16 udl 05/23/18 [Last Taken 

Unknown]


Lisinopril [Zestril 5 mg (*)] 5 mg PO DAILY #30 tab 05/24/18 [Last Taken Unknown

]








Discharge Medications: Refer to the Discharge Home Medication list for PRN 

reason.


PICC Care - Routine: N/A





- Orders


Services needed: Registered Nurse, Physical Therapy, Occupational Therapy


Isolation Type: CDIFF Isolation, Contact Isolation


Oxygen: 3 LPM


Diet Recommendation: no restrictions on diet





- Follow Up Care


Current Providers and Referrals: 


JUAN M ACUNA [Primary Care Provider] - 


Patient,NotPresent [Unknown] - As per Instructions

## 2018-05-24 NOTE — ASMTCMCOM
CM Note

 

CM Note                       

Notes:

Per Aaron Lemons Clinical liaison for [facility] patient accepted to [facility] in 

[place]. They need 5 days of urostomy supplies and RN aware. Spoke to patient and his son Renan 

([phone]). Patient has STM impairment but is able to recall he is going to [place]. He would 

prefer to stay local but not accepted at local facilities,. Final orders via allscripts, PASSR 

sent. PCS form done, face sheet ready.  at 16:30. RN aware and has number to [facility] to 

call report. CM available should other needs arise.



Plan: To SNF at [facility] in [place]

 

Date Signed:  05/24/2018 03:44 PM

Electronically Signed By:Colleen Marks RN

## 2018-05-24 NOTE — HOSPPROG
Hospitalist Progress Note


Assessment/Plan: 





C diff colitis- cont vanc, day 12/14





Sepsis with suspected urinary source- H/O MRSA in his urine.  S/P Ceftriaxone 

and IV Vanc.





Hypocalcemia-improved





Atrial fibrillation-paroxysmal.  Remains in sinus by auscultation.  He is 

neither AV irvin blockers nor anti-coagulation.





Hypertension- anti-hypertensives initially held due to low BP's


   -resume lisinopril at lower dose





Chronic hypoxic respiratory failure secondary to COPD - Note significant 

history of smoking meth. Patient is at his baseline oxygen need to 3-4 L 

continuous.  


   -cont duonebs


   -added symbicort


   -needs ongoing O2





Metabolic alkalosis - suspect CO2 retention.  If mentation changes, will check 

ABG


   -cont to trend





Bladder cancer-patient is status post radical cysto prostatectomy with urostomy 

placement.


   -excellent uop





Depression-continue current Cymbalta.





DVT prophylaxis- Lovenox.





Disposition-the patient is homeless.  [facility] called off transfer as pt 

was loaded into the transport vehicle.  DIscussed placement options with pt and 

CM RN.  It would be sub-optimal to d/c to the street with his O2 needs and 

overall debility.  CM working on placement options.


Subjective: Pt is disappointed about being declined at the last minute by 

[facility].  Spirits are low this am.  Breathing at baseline.  No abdominal 

pain.  Stools more formed.  No fevers.  Eating, ambulating.


Objective: 


 Vital Signs











Temp Pulse Resp BP Pulse Ox


 


 36.6 C   90   18   127/80 H  94 


 


 05/24/18 08:00  05/24/18 09:00  05/24/18 09:00  05/24/18 10:32  05/24/18 09:00








 Laboratory Results





 05/22/18 05:50 





 05/23/18 04:50 





 











 05/23/18 05/24/18 05/25/18





 05:59 05:59 05:59


 


Intake Total 1860 1700 500


 


Output Total 3600 2450 550


 


Balance -1740 -750 -50








 











PT  14.8 SEC (12.0-15.0)   05/12/18  04:30    


 


INR  1.14  (0.83-1.16)   05/12/18  04:30    














- Physical Exam


Constitutional: no apparent distress


Eyes: PERRL


Ears, Nose, Mouth, Throat: moist mucous membranes


Cardiovascular: regular rate and rhythym


Respiratory: no respiratory distress, reduced air movement


Gastrointestinal: normoactive bowel sounds, soft, non-tender abdomen


Skin: warm


Musculoskeletal: full muscle strength


Neurologic: AAOx3


Psychiatric: interacting appropriately





ICD10 Worksheet


Patient Problems: 


 Problems











Problem Status Onset


 


Sepsis Acute  


 


Syncope Acute  


 


Abdominal pain Acute  


 


Bladder cancer Acute  


 


Edema Acute  


 


Gross hematuria Acute  


 


Hydronephrosis, left Acute  


 


MRSA (methicillin resistant Staphylococcus aureus) Acute  ~06/04/17


 


Shortness of breath Acute  


 


Urinary tract infection Acute

## 2018-05-24 NOTE — ASMTCMCOM
CM Note

 

CM Note                       

Notes:

Met with patient to review discharge plan of care. Patient expresses disappointment over events of 

yesterday. More referrals placed in allscripts. Discussed need to widen search area with patient 

and physician. Patient wishes to stay closer to his son but understands that he needs care and is 

not appropriate to dc to the street. CM to follow.



Plan: to SNF



 

 

Date Signed:  05/24/2018 12:25 PM

Electronically Signed By:Colleen Marks RN
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
## 2019-01-12 ENCOUNTER — HOSPITAL ENCOUNTER (EMERGENCY)
Dept: HOSPITAL 80 - FED | Age: 69
Discharge: HOME | End: 2019-01-12
Payer: COMMERCIAL

## 2019-01-12 VITALS — SYSTOLIC BLOOD PRESSURE: 117 MMHG | DIASTOLIC BLOOD PRESSURE: 72 MMHG

## 2019-01-12 DIAGNOSIS — Z85.51: ICD-10-CM

## 2019-01-12 DIAGNOSIS — Z59.0: ICD-10-CM

## 2019-01-12 DIAGNOSIS — Z43.6: Primary | ICD-10-CM

## 2019-01-12 SDOH — ECONOMIC STABILITY - HOUSING INSECURITY: HOMELESSNESS: Z59.0

## 2019-01-12 NOTE — EDPHY
H & P


Stated Complaint: Urostomy eval, no supplies and poss infection etc


Time Seen by Provider: 01/12/19 09:41


HPI/ROS: 


HPI:  This is a 68-year-old male who presents with





Chief Complaint: Urostomy eval, no supplies and poss infection etc





Location:  Urostomy


Quality:  Requesting supplies


Duration:  1 week


Signs and Symptoms: no fever, no nausea, no vomiting, no hematemesis, no blood 

in stool, no abdominal bloating, no diarrhea, no back pain, no urinary symptoms

, no testicular/groin pain, no indigestion, no chest pain, no shortness of 

breath, no skin color changes


Timing:  Acute on chronic


Severity:  Mild


Context:  Patient has a history of bladder cancer with urostomy placed 2018 by 

Dr. Martines, presents with one-week history of not having his urostomy bag or 

wafer.  Patient has been using a depends pole up to soak up the urine for the 

last week.  Patient is followed at the [facility] and reports that the 

nurse ordered the wrong size wait for and supplies therefore he was unable to 

use it.  Patient reports that he normally uses a 2 and a quarter-inch wafer.  

He denies any fever, nausea, vomiting, diarrhea, abdominal pain.


Modifying Factors: 





Comment: 








ROS:  A comprehensive 10 system review of systems is otherwise negative aside 

from elements mentioned in the history of present illness. 





MEDICAL/SURGICAL/SOCIAL HISTORY: 


Medical/surgical history:  bladder ca with urostomy placed 2018 dr martines, 

hernia surg. bilateral knee surgeries, left hip surgery, appy, bladder CA w 

nephrostomy & urostomy, arthritis, prostate ca, copd, history MRSA


Social history:  Homeless.  Family history noncontributory.








CONSTITUTIONAL:  Elderly white male who appears nontoxic, strong urine smell, 

awake and alert, no obvious distress


HEENT: Atraumatic and normocephalic, PERRL, EOMI.  Nares patent; no rhinorrhea;

  no nasal mucosal edema. Tympanic membranes clear. Oropharynx clear, no 

exudate and moist pink mucosa.  Airway patent.  No lymphadenopathy.  No 

meningismus.


Cardiovascular: Normal S1/S2, regular rate, regular rhythm, without murmur rub 

or gallop.


PULMONARY/CHEST:  Symmetrical and nontender. Clear to auscultation bilaterally. 

Good air movement. No accessory muscle usage.


ABDOMEN:  Soft, protuberant, nontender, no rebound, no guarding, no peritoneal 

signs, no masses or organomegaly. No CVAT.  Bowel sounds heard x4 quadrants.  

Urostomy stoma site in the right lower quadrant is pink/viable/good perfusion 

and no signs of erythema, discharge, foul order.  Draining yellow urine.


EXTREMITIES:  2/2 pulses, strength 5/5, no deformities, no clubbing, no 

cyanosis or edema.


NEUROLOGICAL: no focal neuro deficits.  GCS 15.


SKIN: Warm and dry, no erythema. no rash.  Good capillary refill. 








Source: Patient, RN/MD, Old records


Exam Limitations: No limitations





- Personal History


Tetanus Vaccine Date: 2015





- Medical/Surgical History


Hx Asthma: No


Hx Chronic Respiratory Disease: No


Hx Diabetes: No


Hx Cardiac Disease: No


Hx Renal Disease: Yes


Hx Cirrhosis: No


Hx Alcoholism: No


Hx HIV/AIDS: No


Hx Splenectomy or Spleen Trauma: No


Other PMH: bladder ca with urostomy placed 2018 dr martines, hernia surg. 

bilateral knee surgeries, left hip surgery, appy, bladder CA w nephrostomy & 

urostomy, arthritis, prostate ca, copd





- Social History


Smoking Status: Current some day smoker


Constitutional: 


 Initial Vital Signs











Temperature (C)  37.3 C   01/12/19 09:37


 


Heart Rate  84   01/12/19 09:37


 


Respiratory Rate  16   01/12/19 09:37


 


Blood Pressure  117/72   01/12/19 09:37


 


O2 Sat (%)  97   01/12/19 09:37








 











O2 Delivery Mode               Room Air














Allergies/Adverse Reactions: 


 





Penicillins Allergy (Verified 07/16/18 11:18)


 








Home Medications: 














 Medication  Instructions  Recorded


 


NK [No Known Home Meds]  01/12/19














Medical Decision Making


ED Course/Re-evaluation: 


Vital signs reviewed and stable upon arrival.  No systemic signs.


There are no signs of ostomy site infection/sepsis.


Urine is probably chronically colonized and there is no benefit for urinalysis 

as patient is asymptomatic.


Area cleaned with soap and water.  Urostomy wafer and bag applied.


Case management consult for supplies.  , Shefali, [facility] who will follow up with the patient on Monday or Tuesday and reorder his 

supplies.


Patient will follow up with [facility].





This patient was seen under the supervision of my secondary supervising 

physician.  I evaluated care for this patient with attending.  Discussed this 

patient with Dr. Evans who did not see the patient.  








Differential Diagnosis: 


Differential diagnosis includes but is not limited to ostomy supplies.








Departure





- Departure


Disposition: Home, Routine, Self-Care


Clinical Impression: 


 History of urostomy, Encounter for examination of surgical site





Condition: Good


Instructions:  Urostomy Care (ED)


Additional Instructions: 


Please continue with urostomy care as directed.


Follow-up with the [facility] for urostomy supplies.


Referrals: 


JUAN M ACUNA [Primary Care Provider] - As per Instructions


Jewel Martines MD [Medical Doctor] - As per Instructions

## 2019-01-15 ENCOUNTER — HOSPITAL ENCOUNTER (INPATIENT)
Dept: HOSPITAL 80 - FED | Age: 69
LOS: 7 days | Discharge: NURSING, REHAB OR LONG-TERM CARE | DRG: 353 | End: 2019-01-22
Attending: SURGERY | Admitting: SURGERY
Payer: COMMERCIAL

## 2019-01-15 DIAGNOSIS — Z88.0: ICD-10-CM

## 2019-01-15 DIAGNOSIS — G89.29: ICD-10-CM

## 2019-01-15 DIAGNOSIS — G47.33: ICD-10-CM

## 2019-01-15 DIAGNOSIS — Z85.51: ICD-10-CM

## 2019-01-15 DIAGNOSIS — J96.00: ICD-10-CM

## 2019-01-15 DIAGNOSIS — K43.0: Primary | ICD-10-CM

## 2019-01-15 DIAGNOSIS — F12.90: ICD-10-CM

## 2019-01-15 DIAGNOSIS — G93.41: ICD-10-CM

## 2019-01-15 DIAGNOSIS — J18.8: ICD-10-CM

## 2019-01-15 DIAGNOSIS — F15.90: ICD-10-CM

## 2019-01-15 DIAGNOSIS — Z59.0: ICD-10-CM

## 2019-01-15 DIAGNOSIS — I10: ICD-10-CM

## 2019-01-15 DIAGNOSIS — Z93.6: ICD-10-CM

## 2019-01-15 DIAGNOSIS — F32.9: ICD-10-CM

## 2019-01-15 DIAGNOSIS — G43.909: ICD-10-CM

## 2019-01-15 DIAGNOSIS — Z87.440: ICD-10-CM

## 2019-01-15 LAB
INR PPP: 0.92 (ref 0.83–1.16)
PLATELET # BLD: 294 10^3/UL (ref 150–400)
PROTHROMBIN TIME: 12.6 SEC (ref 12–15)

## 2019-01-15 PROCEDURE — 0WQF0ZZ REPAIR ABDOMINAL WALL, OPEN APPROACH: ICD-10-PCS | Performed by: SURGERY

## 2019-01-15 RX ADMIN — OXYCODONE HYDROCHLORIDE AND ACETAMINOPHEN PRN TAB: 5; 325 TABLET ORAL at 12:22

## 2019-01-15 RX ADMIN — OXYCODONE HYDROCHLORIDE AND ACETAMINOPHEN PRN TAB: 5; 325 TABLET ORAL at 19:58

## 2019-01-15 RX ADMIN — CEFOXITIN SCH MLS: 1 INJECTION, POWDER, FOR SOLUTION INTRAVENOUS at 17:47

## 2019-01-15 RX ADMIN — CEFOXITIN SCH MLS: 1 INJECTION, POWDER, FOR SOLUTION INTRAVENOUS at 12:57

## 2019-01-15 RX ADMIN — HYDROMORPHONE HYDROCHLORIDE PRN MG: 1 INJECTION, SOLUTION INTRAMUSCULAR; INTRAVENOUS; SUBCUTANEOUS at 05:06

## 2019-01-15 RX ADMIN — SODIUM CHLORIDE, SODIUM LACTATE, POTASSIUM CHLORIDE, AND CALCIUM CHLORIDE SCH MLS: 600; 310; 30; 20 INJECTION, SOLUTION INTRAVENOUS at 05:12

## 2019-01-15 SDOH — ECONOMIC STABILITY - HOUSING INSECURITY: HOMELESSNESS: Z59.0

## 2019-01-15 NOTE — ASMTCMCOM
CM Note

 

CM Note                       

Notes:

Luke, 67 yo M presents with small bowel obstruction and under went surgery. Pt has a urostomy 

bag, has history of bladder cancer. Has frequent visits in ED/hospital. 

CM reviewed chart and conferred with RN. Pt came from shelter. He reports his son is in a 

"healtcare facility" but reports family members say that he is in [facility]. Cm tried to contact him 

from number in chart and his phone is disconnected. (Renan [phone]). 



Pt reports his Adult Care Manager, Flakita Villagran is very helpful. CM reviewed charts and found 

numbers for Geovanna Cisneros ([facility] counselor [phone]), CM left message requesting callback. CM 

also left message for gideon Salcido's donte RN ([phone]) requesting call back. 



Pt reports he wants to go to a SNF in [place]. According to records, pt has been denied to a 

number of places in [place] and was last discharged to [facility] in [place] in May. 



CM will collaborate with treatment team to develop discharge plan and continue to work with 

collaterals. 

CM to follow. 



Plan: SNF

 

Date Signed:  01/15/2019 02:58 PM

Electronically Signed By:CHRISTIAN Taylor

## 2019-01-15 NOTE — POSTOPPROG
Post Op Note


Date of Operation: 01/15/19


Surgeon: Dilip Kaufman


Anesthesiologist: ALEJANDRO RAPHAEL


Anesthesia: GET(General Endotracheal)


Pre-op Diagnosis: INCARCERATED RECURRENT PARASTOMAL HERNIA


Post-op Diagnosis: SAME


Indication: PAIN


Procedure: OPEN REPAIR OF INCARCERATED PARASTOMAL HERNIA WITH ENTERORHAPHY


Findings: VIABLE SMALL BOWEL/ 5 CM DEFECT


Inf/Abcess present in the surg proc area at time of surgery?: Yes


Depth: Organ Space


EBL: Minimal


Complications: 





NONE


Specimen(s): 





HERNIA SAC

## 2019-01-15 NOTE — PDMN
Medical Necessity


Medical necessity: Pt meets IP criteria as of 1/15/2019 per MD and MCG M-210 (

Intestinal Obstruction); est los  > 2 mn for ongoing tx and management of small 

bowel obstruction secondary to parastomal hernia; requiring surgical 

intervention.

## 2019-01-15 NOTE — PDANEPAE
ANE History of Present Illness





67 yo male with incarcerated parastomal hernia.





ANE Past Medical History





- Cardiovascular History


Hx Hypertension: No


Hx Arrhythmias: No


Hx Chest Pain: No


Hx Coronary Artery / Peripheral Vascular Disease: No


Hx CHF / Valvular Disease: No


Hx Palpitations: No


Cardiovascular History Comment: SWELLING LE





- Pulmonary History


Hx COPD: Yes


Hx Asthma/Reactive Airway Disease: No


Hx Recent Upper Respiratory Infection: No


Hx Oxygen in Use at Home: No


Hx Sleep Apnea: No


Sleep Apnea Screening Result - Last Documented: Negative


Pulmonary History Comment: OLE TRIGGERS





- Neurologic History


Hx Cerebrovascular Accident: No


Hx Seizures: Yes


Hx Dementia: No


Neurologic History Comment: remote h/o migraines.  had sz after CHI, none x5 

years





- Endocrine History


Hx Diabetes: No


Hypothyroid: No


Hyperthyroid: No


Obesity: no





- Renal History


Hx Renal Disorders: Yes


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 

cystoprostatectomy 2016 now with urostomy.  HX UTI 3/2016





- Liver History


Hx Hepatic Disorders: No





- Neurological & Psychiatric Hx


Hx Neurological and Psychiatric Disorders: No


Neurological / Psychiatric History Comment: depression.  homeless





- Cancer History


Hx Cancer: No





- Congenital Disorder History


Hx Congenital Disorders: No





- GI History


Hx Gastrointestinal Disorders: No





- Other Health History


Other Health History: HEARING LOSS.  CHRONIC PAIN/ WEAKNESS ELENO KNEES, L HIP.  

OA





- Chronic Pain History


Chronic Pain: No





- Surgical History


Prior Surgeries: R ELBOW ~2006.  APPENDECTOMY 1966





ANE Review of Systems


Review of Systems: 








- Systems


Gastrointestinal: Reports: abdominal pain





ANE Patient History





- Allergies


Allergies/Adverse Reactions: 








Penicillins Allergy (Verified 01/15/19 02:10)


 








- Home Medications


Home Medications: 








DULoxetine [Cymbalta 30 MG (*)] 30 mg PO HS 01/15/19 [Last Taken Unknown]








- NPO status


NPO Since - Liquids (Date): 01/14/19


NPO Since - Liquids (Time): 22:00


NPO Since - Solids (Date): 01/14/19


NPO Since - Solids (Time): 20:00





- Anes Hx


Anes Hx: no prior problems





- Smoking Hx


Smoking Status: Current some day smoker (pt does not use tobacco, smokes occ 

meth and MJ)


Marijuana use: Yes





- Alcohol Use


Alcohol Use: Sober (h/o abuse)





- Family Anes Hx


Family Hx Anesthesia Complications: Pt's mom was slow to wake up. Pt cannot 

remember details, but believes that she was in the ICU after a surgery because 

she did not wake up quickly.





ANE Labs/Vital Signs





- Labs


Result Diagrams: 


 01/15/19 02:00





 01/15/19 02:00





- Vital Signs


Blood Pressure: 137/87


Heart Rate: 77


Respiratory Rate: 18


O2 Sat (%): 96


Height: 185.42 cm


Weight: 99.79 kg





ANE Physical Exam





- Airway


Neck exam: FROM


Mallampati Score: Class 2


Mouth exam: poor dentition (missing many teeth)





- Pulmonary


Pulmonary: reduced air movement, rhonchi





- Cardiovascular


Cardiovascular: regular rate and rhythym





- ASA Status


ASA Status: III, E





ANE Anesthesia Plan


Anesthesia Plan: general endotracheal anesthesia

## 2019-01-15 NOTE — SOAPPROG
SOAP Progress Note


Assessment/Plan: 


Assessment/Plan: 68 Y M s/p incarcerated parastomal hernia repair, POD#0. 





Post op check. 





Alert but confused. Just getting to room from PACU. 


AFVSS. 


Wounds well dressed.


Clear liquids.





Continue routine post op care. 





Dispo: pending. Apparently is sleeping at the shelter. Could be a difficult 

discharge. 





01/15/19 10:19





Objective: 





 Vital Signs











Temp Pulse Resp BP Pulse Ox


 


 36.1 C   80   16   111/68   94 


 


 01/15/19 09:40  01/15/19 09:40  01/15/19 09:40  01/15/19 09:40  01/15/19 09:40








 











 01/14/19 01/15/19 01/16/19





 05:59 05:59 05:59


 


Intake Total  1000 1500


 


Output Total   15


 


Balance  1000 1485








 











PT  12.6 SEC (12.0-15.0)   01/15/19  02:00    


 


INR  0.92  (0.83-1.16)   01/15/19  02:00    














ICD10 Worksheet


Patient Problems: 


 Problems











Problem Status Onset


 


SBO (small bowel obstruction) Acute  


 


Abdominal pain Acute  


 


Bladder cancer Acute  


 


Edema Acute  


 


Gross hematuria Acute  


 


Hydronephrosis, left Acute  


 


MRSA (methicillin resistant Staphylococcus aureus) Acute  ~06/04/17


 


Sepsis Acute  


 


Shortness of breath Acute  


 


Syncope Acute  


 


Urinary tract infection Acute

## 2019-01-15 NOTE — PDGENHP
History & Physical


Chief Complaint: ABDOMINAL PAIN


History of Present Illness: 68-YEAR-OLD MALE WITH RECURRENT PARASTOMAL HERNIA 

AND SMALL-BOWEL OBSTRUCTION SECONDARY TO THE HERNIA.  ADMIT FOR SURGERY.  RISKS 

AND OPTIONS FULLY DISCUSSED.  CT SCAN CONFIRMS THE PRESENCE OF BOWEL IN THE 

PARASTOMAL HERNIA.  HE HAD A PREVIOUS PARASTOMAL HERNIA REPAIR URINE HALF AGO.  

HE HAS NOT BEEN VOMITING OR FEBRILE THE PAIN IS ONLY BEEN PRESENT FOR 1 DAY


Pertinent Past, Social, Family History: PAST MEDICAL HISTORY:  BLADDER CANCER, 

COPD, HYPERTENSION.  PAST SURGERY:  APPENDECTOMY, INGUINAL HERNIA, PARASTOMAL 

HERNIA, RADICAL CYSTECTOMY WITH URINARY STOMA.  REVIEW OF SYSTEMS NEGATIVE ON A 

FULL 10 POINT REVIEW EXCEPT AS RELATED HPI.  ALLERGIES PENICILLIN.  MEDICINES 

CYMBALTA.  SOCIAL HISTORY POSITIVE TOBACCO USE, HOMELESS SLEEPING AT THE 

SHELTER.  FAMILY HISTORY NONCONTRIBUTORY


Relevant Physical Exam: GENERAL ALERT REASONABLY HEALTHY 68-YEAR-OLD MALE WHO 

IS IN SOME DISCOMFORT.  HEENT NONICTERIC, PERRLA, VERY POOR DENTITION, NO 

ADENOPATHY.  NECK SUPPLE FULL RANGE OF MOTION NO THYROMEGALY.  CHEST CLEAR AND 

SYMMETRIC.  COR REGULAR RHYTHM WITHOUT MURMURS.  ABDOMEN SOFT DISTENDED 

POSITIVE BOWEL SOUNDS PARTIALLY REDUCIBLE PARASTOMAL HERNIA WITH URINARY STOMA 

IN THE RIGHT LOWER QUADRANT.  EXTREMITIES FULL RANGE OF MOTION FULL PULSES.  

NEURO EXAM PHYSIOLOGIC AND SYMMETRIC.  PSYCH EXAM ALERT ORIENTED AND COOPERATIVE


Cardiorespiratory Assessment: IMPRESSION:  SMALL-BOWEL OBSTRUCTION SECONDARY TO 

PARASTOMAL RECURRENT HERNIA.  PLAN IS OPEN SURGICAL REPAIR.  RISKS AND OPTIONS 

FULLY DISCUSSED

## 2019-01-15 NOTE — EDPHY
H & P


Time Seen by Provider: 01/15/19 02:08


HPI/ROS: 





HPI





CHIEF COMPLAINT:  Abdominal pain.





HISTORY OF PRESENT ILLNESS:  68-year-old male presents emergency room the 

homeless shelter 2 hr of abdominal pain he complains of periumbilical abdominal 

pain.  Sharp stabbing.  Nonradiating.  Progressively gotten worse over last 2 

hr.  No vomiting no diarrhea.  Denies fever.  Does state that he indeed his 

urostomy earlier tonight had a foul smell to it.





Past Medical History:  Significant medical history for bladder cancer, sepsis, 

C diff, hypoxic respiratory failure, hypertension, COPD





Past Surgical History:  Appendectomy, inguinal hernia repair, bladder CA with 

ureteral diversion urostomy





Social History:  Denies drugs alcohol tobacco.  Homeless.





Family History:  Noncontributory








ROS   


REVIEW OF SYSTEMS:


10 Systems were reviewed and negative with the exception of the elements 

mentioned in the history of present illness.








Exam   


Constitutional  disheveled, however nontoxic triage nursing summary reviewed, 

vital signs reviewed, awake/alert.  Afebrile


Eyes   normal conjunctivae and sclera, EOMI, PERRLA. 


HENT   normal inspection, atraumatic, moist mucus membranes, no epistaxis, neck 

supple/ no meningismus, no raccoon eyes. 


Respiratory   clear to auscultation bilaterally, normal breath sounds, no 

respiratory distress, no wheezing. 


Cardiovascular   rate normal, regular rhythm, no murmur, no edema, distal 

pulses normal. 


Gastrointestinal   mild tender palpation lower abdomen no peritoneal signs, 

urostomy right lower quadrant, no rebound, no guarding, normal bowel sounds, no 

distension, no pulsatile mass. 


Genitourinary   no CVA tenderness. 


Musculoskeletal  no midline vertebral tenderness, full range of motion, no calf 

swelling, no tenderness of extremities, no meningismus, good pulses, 

neurovascularly intact.


Skin   pink, warm, & dry, no rash, skin atraumatic. 


Neurologic   awake, alert and oriented x 3, AAOx3, moves all 4 extremities 

equally, motor intact, sensory intact, CN II-XII intact, normal cerebellar, 

normal vision, normal speech. 


Psychiatric   normal mood/affect. 


Heme/Lymph/Immune   no lymphadenopathy.





Differential Diagnosis:  Differential diagnosis includes but is not limited to 

and in no particular order:  Bowel obstruction, appendicitis, gallbladder 

disease, diverticulitis, colitis, enteritis, perforated viscus, gastritis, GERD

, esophagitis, urinary tract infection, pyelonephritis, kidney stones, UTI, 

sepsis





Medical Decision Making:  Plan for this patient IV establishment, IV fluid bolus

, basic blood work, urinalysis, KUB for obstructive pathology and re-evaluate.








Of note this patient was seen here on January 12th for new urostomy supplies 

before that he was using a depends to help soak up his urine.








Re-evaluation:








KUB reviewed shows abnormal bowel gas patterns.  Concerning for bowel 

obstruction.  Given patient's previous surgical history will perform CT scan 

abdomen pelvis with IV contrast.





CT scan abdomen pelvis with IV contrast shows SBO.





Consult General surgery for SBO management.





0300:  Consult General surgery Dr. Kaufman for SBO.  Will see and evaluate the 

patient.


Patient is not vomiting.  Patient resting comfortably no acute distress.


Source: Patient, EMS





- Personal History


Tetanus Vaccine Date: 2015





- Medical/Surgical History


Hx Asthma: No


Hx Chronic Respiratory Disease: No


Hx Diabetes: No


Hx Cardiac Disease: No


Hx Renal Disease: Yes


Hx Cirrhosis: No


Hx Alcoholism: No


Hx HIV/AIDS: No


Hx Splenectomy or Spleen Trauma: No


Other PMH: bladder ca with urostomy placed 2018 dr castro, hernia surg. 

bilateral knee surgeries, left hip surgery, appy, bladder CA w nephrostomy & 

urostomy, arthritis, prostate ca, copd





- Social History


Smoking Status: Current some day smoker


Constitutional: 


 Initial Vital Signs











Temperature (C)  36.6 C   01/15/19 02:07


 


Heart Rate  88   01/15/19 02:07


 


Respiratory Rate  18   01/15/19 02:07


 


Blood Pressure  148/95 H  01/15/19 02:07


 


O2 Sat (%)  93   01/15/19 02:07








 











O2 Delivery Mode               Room Air


 


O2 (L/minute)                  2














Allergies/Adverse Reactions: 


 





No Known Allergies Allergy (Verified 01/15/19 14:18)


 








Home Medications: 














 Medication  Instructions  Recorded


 


DULoxetine [Cymbalta 30 MG (*)] 30 mg PO BID 01/15/19


 


Lisinopril [Zestril 10 mg (*)] 10 mg PO DAILY 01/15/19


 


Meloxicam 7.5 mg PO BID 01/15/19














Medical Decision Making





- Data Points


Laboratory Results: 


 Laboratory Results





 01/15/19 02:00 





 01/15/19 02:00 








Medications Given: 


 





Hydromorphone HCl (Dilaudid)  0.5 mg IVP Q4HRS PRN


   PRN Reason: Pain,severe


   Stop: 01/25/19 04:49


   Last Admin: 01/15/19 05:06 Dose:  0.5 mg


Lactated Ringer's (Lr)  1,000 mls @ 100 mls/hr IV CONT LINDA


   Stop: 07/14/19 04:59


   Last Admin: 01/15/19 05:12 Dose:  1,000 mls


Cefoxitin Sodium 1 gm/ Sodium (Chloride)  50 mls @ 200 mls/hr IV Q6HRS LINDA


   PRN Reason: Protocol


   Stop: 01/19/19 11:59


   Last Admin: 01/15/19 17:47 Dose:  50 mls


Potassium Chloride/Dextrose/Sod Cl (D5w 1/2 Ns W/ 20 Kcl/L)  1,000 mls @ 75 mls/

hr IV CONT LINDA


   Stop: 07/14/19 08:59


   Last Admin: 01/15/19 12:57 Dose:  1,000 mls


Oxycodone/Acetaminophen (Percocet 5/325)  1 - 2 tab PO Q4 PRN


   PRN Reason: Pain, Severe Able to Take PO


   Stop: 01/25/19 08:47


   Last Admin: 01/15/19 19:58 Dose:  2 tab





Discontinued Medications





Bupivacaine HCl (Sensorcaine 0.5% Vial) Confirm Administered Dose 30 ml .ROUTE 

.STK-MED ONE


   Stop: 01/15/19 04:39


   Last Admin: 01/15/19 07:18 Dose:  30 ml


Hydromorphone HCl (Dilaudid)  0.5 mg IVP EDNOW ONE


   Stop: 01/15/19 02:06


   Last Admin: 01/15/19 02:12 Dose:  0.5 mg


Sodium Chloride (Ns)  1,000 mls @ 0 mls/hr IV EDNOW ONE; Wide Open


   PRN Reason: Protocol


   Stop: 01/15/19 02:06


   Last Admin: 01/15/19 02:12 Dose:  1,000 mls


Cefoxitin Sodium 2 gm/ Sodium (Chloride)  100 mls @ 200 mls/hr IV ONCALL ONE


   Stop: 01/15/19 05:29


   Last Admin: 01/15/19 05:11 Dose:  100 mls


Ondansetron HCl (Zofran)  4 mg IVP EDNOW ONE


   Stop: 01/15/19 02:06


   Last Admin: 01/15/19 02:12 Dose:  4 mg








Departure





- Departure


Disposition: [facility]


Clinical Impression: 


 SBO (small bowel obstruction)





Condition: Fair

## 2019-01-16 LAB — PLATELET # BLD: 248 10^3/UL (ref 150–400)

## 2019-01-16 RX ADMIN — ONDANSETRON PRN MG: 2 SOLUTION INTRAMUSCULAR; INTRAVENOUS at 12:25

## 2019-01-16 RX ADMIN — OXYCODONE HYDROCHLORIDE AND ACETAMINOPHEN PRN TAB: 5; 325 TABLET ORAL at 05:07

## 2019-01-16 RX ADMIN — KETOROLAC TROMETHAMINE SCH MG: 15 INJECTION, SOLUTION INTRAMUSCULAR; INTRAVENOUS at 18:08

## 2019-01-16 RX ADMIN — OXYCODONE HYDROCHLORIDE AND ACETAMINOPHEN PRN TAB: 5; 325 TABLET ORAL at 00:19

## 2019-01-16 RX ADMIN — KETOROLAC TROMETHAMINE SCH MG: 15 INJECTION, SOLUTION INTRAMUSCULAR; INTRAVENOUS at 08:16

## 2019-01-16 RX ADMIN — CEFOXITIN SCH MLS: 1 INJECTION, POWDER, FOR SOLUTION INTRAVENOUS at 05:07

## 2019-01-16 RX ADMIN — KETOROLAC TROMETHAMINE SCH MG: 15 INJECTION, SOLUTION INTRAMUSCULAR; INTRAVENOUS at 12:25

## 2019-01-16 RX ADMIN — CEFOXITIN SCH MLS: 1 INJECTION, POWDER, FOR SOLUTION INTRAVENOUS at 18:08

## 2019-01-16 RX ADMIN — ONDANSETRON PRN MG: 2 SOLUTION INTRAMUSCULAR; INTRAVENOUS at 21:00

## 2019-01-16 RX ADMIN — SODIUM CHLORIDE, SODIUM LACTATE, POTASSIUM CHLORIDE, AND CALCIUM CHLORIDE SCH MLS: 600; 310; 30; 20 INJECTION, SOLUTION INTRAVENOUS at 00:29

## 2019-01-16 RX ADMIN — CEFOXITIN SCH MLS: 1 INJECTION, POWDER, FOR SOLUTION INTRAVENOUS at 00:19

## 2019-01-16 RX ADMIN — CEFOXITIN SCH MLS: 1 INJECTION, POWDER, FOR SOLUTION INTRAVENOUS at 12:30

## 2019-01-16 RX ADMIN — LISINOPRIL SCH MG: 10 TABLET ORAL at 09:50

## 2019-01-16 NOTE — SOAPPROG
SOAP Progress Note


Assessment/Plan: 


Assessment/Plan: 68 Y M s/p incarcerated parastomal hernia repair, POD#1. 





Changed PO narcotics to norco--he says this works best for him. Started 

toradol. He does not recall being on meloxicam. Also restarting cymbalta. 


Routine urostomy care. Incision is under wafer. 


Regular diet. 





Dispo: Could d/c once pain in control. He has been sleeping at the shelter. 

Will need case management assistance for safe discharge. Thanks. 





S: c/o pain. per nurses, slept well had had large BM. 


O:


Alert, nad


ctab


rrr


abd soft, +BS, urostomy pink, inc under wafer (did not view, but per RN, looks 

good)





01/16/19 08:41





Objective: 





 Vital Signs











Temp Pulse Resp BP Pulse Ox


 


 36.7 C   75   20   110/62   92 


 


 01/16/19 07:53  01/16/19 07:53  01/16/19 07:53  01/16/19 07:53  01/16/19 07:53








 Laboratory Results





 01/16/19 04:30 





 01/16/19 04:30 





 











 01/15/19 01/16/19 01/17/19





 05:59 05:59 05:59


 


Intake Total 1000 3783 


 


Output Total  2340 


 


Balance 1000 1443 








 











PT  12.6 SEC (12.0-15.0)   01/15/19  02:00    


 


INR  0.92  (0.83-1.16)   01/15/19  02:00    














ICD10 Worksheet


Patient Problems: 


 Problems











Problem Status Onset


 


SBO (small bowel obstruction) Acute  


 


Abdominal pain Acute  


 


Bladder cancer Acute  


 


Edema Acute  


 


Gross hematuria Acute  


 


Hydronephrosis, left Acute  


 


MRSA (methicillin resistant Staphylococcus aureus) Acute  ~06/04/17


 


Sepsis Acute  


 


Shortness of breath Acute  


 


Syncope Acute  


 


Urinary tract infection Acute

## 2019-01-17 LAB — PLATELET # BLD: 274 10^3/UL (ref 150–400)

## 2019-01-17 RX ADMIN — DEXTROSE MONOHYDRATE, SODIUM CHLORIDE, AND POTASSIUM CHLORIDE SCH MLS: 50; 4.5; 1.49 INJECTION, SOLUTION INTRAVENOUS at 12:46

## 2019-01-17 RX ADMIN — CEFOXITIN SCH MLS: 1 INJECTION, POWDER, FOR SOLUTION INTRAVENOUS at 06:14

## 2019-01-17 RX ADMIN — KETOROLAC TROMETHAMINE SCH MG: 15 INJECTION, SOLUTION INTRAMUSCULAR; INTRAVENOUS at 05:10

## 2019-01-17 RX ADMIN — KETOROLAC TROMETHAMINE SCH MG: 15 INJECTION, SOLUTION INTRAMUSCULAR; INTRAVENOUS at 18:21

## 2019-01-17 RX ADMIN — LISINOPRIL SCH: 10 TABLET ORAL at 09:41

## 2019-01-17 RX ADMIN — HYDROMORPHONE HYDROCHLORIDE PRN MG: 1 INJECTION, SOLUTION INTRAMUSCULAR; INTRAVENOUS; SUBCUTANEOUS at 21:54

## 2019-01-17 RX ADMIN — CEFOXITIN SCH MLS: 1 INJECTION, POWDER, FOR SOLUTION INTRAVENOUS at 00:10

## 2019-01-17 RX ADMIN — CEFOXITIN SCH MLS: 1 INJECTION, POWDER, FOR SOLUTION INTRAVENOUS at 18:20

## 2019-01-17 RX ADMIN — SODIUM CHLORIDE, SODIUM LACTATE, POTASSIUM CHLORIDE, AND CALCIUM CHLORIDE SCH MLS: 600; 310; 30; 20 INJECTION, SOLUTION INTRAVENOUS at 00:11

## 2019-01-17 RX ADMIN — CEFOXITIN SCH MLS: 1 INJECTION, POWDER, FOR SOLUTION INTRAVENOUS at 12:46

## 2019-01-17 RX ADMIN — KETOROLAC TROMETHAMINE SCH MG: 15 INJECTION, SOLUTION INTRAMUSCULAR; INTRAVENOUS at 12:46

## 2019-01-17 RX ADMIN — ONDANSETRON PRN MG: 2 SOLUTION INTRAMUSCULAR; INTRAVENOUS at 21:54

## 2019-01-17 RX ADMIN — KETOROLAC TROMETHAMINE SCH MG: 15 INJECTION, SOLUTION INTRAMUSCULAR; INTRAVENOUS at 00:10

## 2019-01-17 RX ADMIN — ONDANSETRON PRN MG: 2 SOLUTION INTRAMUSCULAR; INTRAVENOUS at 03:08

## 2019-01-17 RX ADMIN — DEXTROSE MONOHYDRATE, SODIUM CHLORIDE, AND POTASSIUM CHLORIDE SCH MLS: 50; 4.5; 1.49 INJECTION, SOLUTION INTRAVENOUS at 22:07

## 2019-01-17 RX ADMIN — ONDANSETRON PRN MG: 2 SOLUTION INTRAMUSCULAR; INTRAVENOUS at 08:02

## 2019-01-17 NOTE — GOP
DATE OF OPERATION:  01/15/2019



SURGEON:  Dilip Kaufman MD



ASSISTANT:  There was no assistant.



ANESTHESIOLOGIST:  Dr. Emily Swartz



PREOPERATIVE DIAGNOSIS:  Incarcerated peristomal hernia.



POSTOPERATIVE DIAGNOSIS:  Incarcerated peristomal hernia.



PROCEDURE PERFORMED:  Open repair of incarcerated peristomal hernia with enterorrhaphy.



FINDINGS:  Patient was found to have incarcerated small bowel and a peristomal hernia measuring appro
ximately 4 cm in diameter.  The bowel was, otherwise, viable.  One small enterotomy taking the previo
us anastomosis down off the anterior abdominal wall.





DESCRIPTION OF PROCEDURE:  The patient was taken to the operating room, where he received satisfactor
y general endotracheal anesthesia, placed in the supine position, prepped and draped in usual sterile
 fashion.  



Curvilinear incision was made approximately an inch lateral to the urinary stoma and dissection layton
ed carefully down through the subcutaneous tissue.  The hernia sac was identified.  It was dissected 
free from surrounding subcutaneous tissue.  It was dissected down to the fascial level.  At that poin
t, the sac was opened, its contents were reduced and the sac was actually dissected free and exposed.
  Subfascial tunnel was developed and the fascial wall were cleared of all adherent bowel loops.  The
 urinary stoma was well identified and spared from injury.  The tract was marked with the Hegar dilat
or, as well as a small Reid balloon.  After delineating the hernia defect in the anatomy, the fascia
 was approximated with interrupted #1 PDS figure-of-eight sutures, subcu was then closed with a runni
ng Vicryl suture, and the skin with a 4-0 Monocryl subcuticular stitch, which was then covered with s
ome Dermabond.  The stoma was redressed.  He tolerated the procedure well.  He was still making good 
urine.  There were no complications.  Taken care room in good condition.





Job #:  [id number]

## 2019-01-17 NOTE — ASMTCMCOM
CM Note

 

CM Note                       

Notes:

PT/OT recommending SNF. CM completed PASSAR and submit referrals to various SNFs. Pt signed consent 


for CM to speak with counselor at [facility]. CM awaiting call back. 



Plan: SNF

 

Date Signed:  01/17/2019 09:21 AM

Electronically Signed By:CHRISTIAN Taylor

## 2019-01-18 LAB — PLATELET # BLD: 272 10^3/UL (ref 150–400)

## 2019-01-18 RX ADMIN — KETOROLAC TROMETHAMINE SCH MG: 15 INJECTION, SOLUTION INTRAMUSCULAR; INTRAVENOUS at 19:44

## 2019-01-18 RX ADMIN — DEXTROSE MONOHYDRATE, SODIUM CHLORIDE, AND POTASSIUM CHLORIDE SCH MLS: 50; 4.5; 1.49 INJECTION, SOLUTION INTRAVENOUS at 16:23

## 2019-01-18 RX ADMIN — LISINOPRIL SCH: 10 TABLET ORAL at 10:16

## 2019-01-18 RX ADMIN — KETOROLAC TROMETHAMINE SCH MG: 15 INJECTION, SOLUTION INTRAMUSCULAR; INTRAVENOUS at 00:24

## 2019-01-18 RX ADMIN — CEFOXITIN SCH MLS: 1 INJECTION, POWDER, FOR SOLUTION INTRAVENOUS at 13:23

## 2019-01-18 RX ADMIN — CEFOXITIN SCH MLS: 1 INJECTION, POWDER, FOR SOLUTION INTRAVENOUS at 19:43

## 2019-01-18 RX ADMIN — KETOROLAC TROMETHAMINE SCH MG: 15 INJECTION, SOLUTION INTRAMUSCULAR; INTRAVENOUS at 13:22

## 2019-01-18 RX ADMIN — DEXTROSE MONOHYDRATE, SODIUM CHLORIDE, AND POTASSIUM CHLORIDE SCH MLS: 50; 4.5; 1.49 INJECTION, SOLUTION INTRAVENOUS at 05:46

## 2019-01-18 RX ADMIN — CEFOXITIN SCH MLS: 1 INJECTION, POWDER, FOR SOLUTION INTRAVENOUS at 05:47

## 2019-01-18 RX ADMIN — KETOROLAC TROMETHAMINE SCH MG: 15 INJECTION, SOLUTION INTRAMUSCULAR; INTRAVENOUS at 05:47

## 2019-01-18 RX ADMIN — CEFOXITIN SCH MLS: 1 INJECTION, POWDER, FOR SOLUTION INTRAVENOUS at 00:24

## 2019-01-18 NOTE — ASMTCMCOM
CM Note

 

CM Note                       

Notes:

Pt has been accepted to [facility] for short term rehab and has agreed to go, however, pt has 

refused therapies and they have not been able to make SNF recommendations. LTC Medicaid application 


and [id number] form also initiated. [employer] emailed to begin application. [facility] has 48 business hours 

to evaluate pt for LTC, however pt could go to [facility] before this occurs under medicare, pending 


PT/OT evaluations. CM communicated this to pt and encouraged participation in therapies. [facility] 

updated with PASSR and ULTC-100 via fax and phone.CM to follow. 



D/C Plan: [facility] to LTC 

 

Date Signed:  01/18/2019 04:59 PM

Electronically Signed By:Mayra Warner

## 2019-01-18 NOTE — SOAPPROG
SOAP Progress Note


Assessment/Plan: 





Assessment:





68 Y M s/p incarcerated parastomal hernia repair, POD#3


Now with possible ileus vs. SBO.  Abdominal xray ordered for this am.





S: Feeling better.  Had loose BM last night and is passing gas.  Denies nausea.

  Pain controlled with toradol





O: Alert


Afebrile


VSS


rrr


no increased wob


abdomen: soft, slightly distended, rare BS, urostomy with appliance in place, 

incision cdi





Plan:  Pt appears to be improving.  Will continue NPO and NG tube for now.  

Originally thought pt may need surgery for SBO, but will hold off for now given 

clinical improvement. 





01/18/19 09:15





Objective: 





 Vital Signs











Temp Pulse Resp BP Pulse Ox


 


 36.6 C   75   16   100/67   83 L


 


 01/18/19 08:00  01/18/19 08:00  01/18/19 08:00  01/18/19 08:00  01/18/19 08:00








 Laboratory Results





 01/18/19 03:58 





 01/18/19 03:58 





 











 01/17/19 01/18/19 01/19/19





 05:59 05:59 05:59


 


Intake Total 1350 3473 


 


Output Total 2450 6600 


 


Balance -1100 -3127 








 











PT  12.6 SEC (12.0-15.0)   01/15/19  02:00    


 


INR  0.92  (0.83-1.16)   01/15/19  02:00    














ICD10 Worksheet


Patient Problems: 


 Problems











Problem Status Onset


 


SBO (small bowel obstruction) Acute  


 


Abdominal pain Acute  


 


Bladder cancer Acute  


 


Edema Acute  


 


Gross hematuria Acute  


 


Hydronephrosis, left Acute  


 


MRSA (methicillin resistant Staphylococcus aureus) Acute  ~06/04/17


 


Sepsis Acute  


 


Shortness of breath Acute  


 


Syncope Acute  


 


Urinary tract infection Acute

## 2019-01-19 RX ADMIN — KETOROLAC TROMETHAMINE SCH MG: 15 INJECTION, SOLUTION INTRAMUSCULAR; INTRAVENOUS at 23:04

## 2019-01-19 RX ADMIN — TAZOBACTAM SODIUM AND PIPERACILLIN SODIUM SCH MLS: 500; 4 INJECTION, SOLUTION INTRAVENOUS at 23:03

## 2019-01-19 RX ADMIN — KETOROLAC TROMETHAMINE SCH MG: 15 INJECTION, SOLUTION INTRAMUSCULAR; INTRAVENOUS at 00:47

## 2019-01-19 RX ADMIN — CEFOXITIN SCH MLS: 1 INJECTION, POWDER, FOR SOLUTION INTRAVENOUS at 00:40

## 2019-01-19 RX ADMIN — KETOROLAC TROMETHAMINE SCH MG: 15 INJECTION, SOLUTION INTRAMUSCULAR; INTRAVENOUS at 18:42

## 2019-01-19 RX ADMIN — CEFOXITIN SCH MLS: 1 INJECTION, POWDER, FOR SOLUTION INTRAVENOUS at 06:18

## 2019-01-19 RX ADMIN — LISINOPRIL SCH: 10 TABLET ORAL at 11:09

## 2019-01-19 RX ADMIN — KETOROLAC TROMETHAMINE SCH MG: 15 INJECTION, SOLUTION INTRAMUSCULAR; INTRAVENOUS at 06:18

## 2019-01-19 RX ADMIN — DEXTROSE MONOHYDRATE, SODIUM CHLORIDE, AND POTASSIUM CHLORIDE SCH MLS: 50; 4.5; 1.49 INJECTION, SOLUTION INTRAVENOUS at 00:15

## 2019-01-19 RX ADMIN — TAZOBACTAM SODIUM AND PIPERACILLIN SODIUM SCH MLS: 500; 4 INJECTION, SOLUTION INTRAVENOUS at 19:00

## 2019-01-19 RX ADMIN — KETOROLAC TROMETHAMINE SCH MG: 15 INJECTION, SOLUTION INTRAMUSCULAR; INTRAVENOUS at 13:07

## 2019-01-19 RX ADMIN — KETOROLAC TROMETHAMINE SCH: 15 INJECTION, SOLUTION INTRAMUSCULAR; INTRAVENOUS at 00:23

## 2019-01-19 RX ADMIN — HYDROMORPHONE HYDROCHLORIDE PRN MG: 1 INJECTION, SOLUTION INTRAMUSCULAR; INTRAVENOUS; SUBCUTANEOUS at 00:14

## 2019-01-19 NOTE — SOAPPROG
SOAP Progress Note


Assessment/Plan: 


Assessment:








68 Y M s/p incarcerated parastomal hernia repair, POD#4


Now with possible ileus vs. SBO.  





Had a tough night last night - very confused,  pulled out NG.  Gave 1 dose 

Ativan





I reviewed his chart in [facility] and also has COPD, heart failure and 

paroxysmal Afib.  Followed by Dr. Ron.


Due to confusion and co-morbidities, I asked hospitalists to co-manage








I reviewed his MAR and don't see over use of narcotics.


Per [place] no use of alcohol although I query why he is so confused.








S: Very confused.  Does not remember pulling NG and states was begging to have 

it put back in.  Does not know that he is in the hospital or that he had 

surgery.  States has been looking for us for 2 days.  


Wants to eat.  States passing gas but this seems unreliable 





O: Alert, sitting on edge of bed, wont lie down even when re-directed


Afebrile


VSS


CTAB


Regular rate


Abdomen with distension but soft.  BS hypoactive  Incision cdi.  


Urostomy with appliance in place





01/18/19 09:15




















Plan:





01/19/19 09:51





01/19/19 10:02





01/19/19 10:05





Objective: 





 Vital Signs











Temp Pulse Resp BP Pulse Ox


 


 36.8 C   78   18   123/68 H  97 


 


 01/19/19 00:00  01/19/19 08:50  01/19/19 08:50  01/19/19 00:00  01/19/19 08:50








 Laboratory Results





 01/18/19 03:58 





 01/18/19 03:58 





 











 01/18/19 01/19/19 01/20/19





 05:59 05:59 05:59


 


Intake Total 3473 2686 


 


Output Total 6600 1250 


 


Balance -3127 1436 








 











PT  12.6 SEC (12.0-15.0)   01/15/19  02:00    


 


INR  0.92  (0.83-1.16)   01/15/19  02:00    














ICD10 Worksheet


Patient Problems: 


 Problems











Problem Status Onset


 


SBO (small bowel obstruction) Acute  


 


Abdominal pain Acute  


 


Bladder cancer Acute  


 


Edema Acute  


 


Gross hematuria Acute  


 


Hydronephrosis, left Acute  


 


MRSA (methicillin resistant Staphylococcus aureus) Acute  ~06/04/17


 


Sepsis Acute  


 


Shortness of breath Acute  


 


Syncope Acute  


 


Urinary tract infection Acute

## 2019-01-19 NOTE — GCON
INTERNAL MEDICINE CONSULTATION



DATE OF CONSULTATION:  01/19/2019





REASON FOR CONSULTATION:  Medical opinion regarding delirium.



HISTORY:  Luke is a 68-year-old male admitted on January 15th.  He went to surgery on January 16th w
ith Dr. Kaufman for an incarcerated peristomal hernia.  He has a history of bladder cancer status post 
radical cystectomy with a urinary stoma.  



He was noted by nursing to have some baseline forgetfulness since admission.  He would often need rem
inders on simple tasks.  Last night, however, he became acutely more delirious and confused, would no
t persist into today.  Postoperatively, he has developed a partial small bowel obstruction versus ile
us and had an NG tube in place.  Last night, he was found in the [facility] agitated.  He had pulled out hi
s NG and his IV.  The patient is now lying comfortably in bed and is cooperative.  He complains of so
me postoperative pain.  He says he did pass some gas and had a bowel movement yesterday.  He has had 
no nausea and vomiting today.  The NG tube is due for replacement.  He is otherwise without complaint
s.



PAST MEDICAL HISTORY:  

1.  Recurrent small bowel obstruction secondary to parastomal hernia.

2.  Bladder cancer status post radical cystectomy with urinary stoma.

3.  COPD.

4.  Hypertension.

5.  Atrial fibrillation.



PAST SURGICAL HISTORY:  Appendectomy.



MEDICATIONS:  Please see computerized record for full details.



ALLERGIES:  No known drug allergies.



SOCIAL HISTORY:  He denies a significant smoking history.  He denies any current alcohol use.  Review
 of the chart reveals he did abuse meth and alcohol in the past, but none recently.  He is currently 
homeless.



REVIEW OF SYSTEMS:  Complete review of systems obtained.  Review of systems negative regarding consti
tutional, HEENT, GI, pulmonary, cardiovascular, , hematology, skin, muscle, endocrine, psych, excep
t for positives and negatives as noted under HPI.



FAMILY HISTORY:  Reviewed and noncontributory to presenting complaint.



PHYSICAL EXAMINATION:  GENERAL:  Well-developed, well-nourished male, in no acute distress.  VITAL SI
GNS:  Temperature 36.8, pulse 78, blood pressure 123/68, saturating 93% on 3 L.  He does desaturate t
o 83% on room air.  Earlier in this hospitalization, his oxygen at one point got as bad as 90% on 6 L
.  EYES:  Normal conjunctivae.  Pupils equal and react to light.  ENT:  Normal ears and nose.  Hearin
g intact.  Normal teeth.  Oropharynx moist.  NECK:  Trachea midline.  No thyromegaly.  CHEST:  Normal
 effort.  Lungs are clear to auscultation bilaterally.  CARDIOVASCULAR SYSTEM:  Regular rate and rhyt
hm.  No murmur.  No lower extremity edema.  ABDOMEN:  Soft, nontender.  No hepatosplenomegaly.  SKIN:
  Warm, dry, intact without rash.  MUSCULOSKELETAL:  No cyanosis or clubbing.  Strength 5/5, upper an
d lower extremities.  NEUROLOGIC:  Cranial nerves intact.  Normal sensation to light touch.  PSYCH AS
SESSMENT:  He is awake, alert, cooperative, answering simple questions, but flat affect.  Poor memory
.  Poor judgment and insight.  Slow to respond.



LABORATORY DATA:  White count 5.16, hematocrit 34.1, platelets 272, sodium 141, potassium 4.3, chlori
de 104, bicarb 32, BUN 37, creatinine 0.9, glucose 117.  Troponin is negative.  UA shows  white
 blood cells.  CT scan of the abdomen and pelvis shows obstruction versus ileus.



MEDICAL RECORD REVIEW:  He has extensive previous hospitalizations here.  Last hospitalization was in
 May 2018 with sepsis due to urinary tract infection.



ASSESSMENT/PLAN:  

1.  Metabolic encephalopathy.  He does appear to have some baseline cognitive dysfunction, however, l
ast evening got acutely much more confused with agitation.  I think this is a result of his poor medi
jared condition related to his persistent small-bowel obstruction and respiratory failure, and I antici
bettencourt metabolic encephalopathy will improve as his medical condition improves.  

2.  Incarcerated peristomal hernia.  Postoperative day #3, status post surgical repair with Dr. Kaufman
.

3.  Postoperative small bowel obstruction versus ileus.  He pulled his NG tube last night and it is d
ue to be replaced.

4.  Bladder cancer with urostomy.  Urinalysis is positive, but it is likely chronically colonized and
 I doubt urinary tract infection. 

5.  Acute respiratory failure.  Saturations as bad as 90% on 6 L earlier in this hospitalization.  He
 still continues to desaturate significantly when he pulls off his oxygen.  We will check a chest x-r
ay. 



Thank you very much for this consultation.  Internal Medicine will continue to follow.





Job #:  [id number]

## 2019-01-20 LAB — PLATELET # BLD: 252 10^3/UL (ref 150–400)

## 2019-01-20 RX ADMIN — LISINOPRIL SCH MG: 10 TABLET ORAL at 10:27

## 2019-01-20 RX ADMIN — TAZOBACTAM SODIUM AND PIPERACILLIN SODIUM SCH MLS: 500; 4 INJECTION, SOLUTION INTRAVENOUS at 23:15

## 2019-01-20 RX ADMIN — TAZOBACTAM SODIUM AND PIPERACILLIN SODIUM SCH: 500; 4 INJECTION, SOLUTION INTRAVENOUS at 16:08

## 2019-01-20 RX ADMIN — ENOXAPARIN SODIUM SCH: 100 INJECTION SUBCUTANEOUS at 15:21

## 2019-01-20 RX ADMIN — TAZOBACTAM SODIUM AND PIPERACILLIN SODIUM SCH MLS: 500; 4 INJECTION, SOLUTION INTRAVENOUS at 16:55

## 2019-01-20 RX ADMIN — HYDROMORPHONE HYDROCHLORIDE PRN MG: 1 INJECTION, SOLUTION INTRAMUSCULAR; INTRAVENOUS; SUBCUTANEOUS at 01:47

## 2019-01-20 RX ADMIN — HYDROMORPHONE HYDROCHLORIDE PRN MG: 1 INJECTION, SOLUTION INTRAMUSCULAR; INTRAVENOUS; SUBCUTANEOUS at 06:01

## 2019-01-20 RX ADMIN — KETOROLAC TROMETHAMINE SCH MG: 15 INJECTION, SOLUTION INTRAMUSCULAR; INTRAVENOUS at 23:15

## 2019-01-20 RX ADMIN — KETOROLAC TROMETHAMINE SCH: 15 INJECTION, SOLUTION INTRAMUSCULAR; INTRAVENOUS at 15:22

## 2019-01-20 RX ADMIN — DEXTROSE MONOHYDRATE, SODIUM CHLORIDE, AND POTASSIUM CHLORIDE SCH MLS: 50; 4.5; 1.49 INJECTION, SOLUTION INTRAVENOUS at 03:32

## 2019-01-20 RX ADMIN — KETOROLAC TROMETHAMINE SCH MG: 15 INJECTION, SOLUTION INTRAMUSCULAR; INTRAVENOUS at 18:23

## 2019-01-20 RX ADMIN — TAZOBACTAM SODIUM AND PIPERACILLIN SODIUM SCH MLS: 500; 4 INJECTION, SOLUTION INTRAVENOUS at 04:37

## 2019-01-20 RX ADMIN — KETOROLAC TROMETHAMINE SCH MG: 15 INJECTION, SOLUTION INTRAMUSCULAR; INTRAVENOUS at 04:37

## 2019-01-20 NOTE — HOSPPROG
Hospitalist Progress Note


Assessment/Plan: 





67 yo M w postop encephalopathy





encephalopathy: acute


   doubt etoh withdrawal as not tachy


   lytes and labs ok


   reduce dose of phenergan


   limit ativan


   toxic metabolic etiology





parastomal hernia: s/p repair





prop: [facility]





pain: prn narcotics





dispo: inpt


Subjective: case d/w dr chowdhury.  asking to eat.  perseverating


Objective: 


 Vital Signs











Temp Pulse Resp BP Pulse Ox


 


 36.8 C   77   18   152/100 H  98 


 


 01/20/19 15:33  01/20/19 15:33  01/20/19 15:33  01/20/19 15:33  01/20/19 15:33








 Laboratory Results





 01/20/19 04:44 





 01/20/19 04:44 





 











 01/19/19 01/20/19 01/21/19





 05:59 05:59 05:59


 


Intake Total 2686 1313 1538


 


Output Total 1250 1350 


 


Balance 1436 -37 1538








 











PT  12.6 SEC (12.0-15.0)   01/15/19  02:00    


 


INR  0.92  (0.83-1.16)   01/15/19  02:00    














- Physical Exam


Constitutional: no apparent distress, appears nourished


Eyes: PERRL, anicteric sclera


Ears, Nose, Mouth, Throat: moist mucous membranes, hearing normal, other (NGT)


Cardiovascular: regular rate and rhythym, no murmur, rub, or gallop


Respiratory: no respiratory distress, no rales or rhonchi


Gastrointestinal: normoactive bowel sounds, distension, No guarding, No rebound


Genitourinary: no bladder fullness, other (christie in urostomy)


Skin: warm, normal color


Musculoskeletal: full muscle strength


Neurologic: AAOx3





ICD10 Worksheet


Patient Problems: 


 Problems











Problem Status Onset


 


SBO (small bowel obstruction) Acute  


 


Abdominal pain Acute  


 


Bladder cancer Acute  


 


Edema Acute  


 


Gross hematuria Acute  


 


Hydronephrosis, left Acute  


 


MRSA (methicillin resistant Staphylococcus aureus) Acute  ~06/04/17


 


Sepsis Acute  


 


Shortness of breath Acute  


 


Syncope Acute  


 


Urinary tract infection Acute

## 2019-01-20 NOTE — SOAPPROG
SOAP Progress Note


Assessment/Plan: 


Assessment:








68 Y M s/p incarcerated parastomal hernia repair, POD#5


Very difficult to see if return of bowel function


NG output Decreased significantly.  DC NG


Will start clears and go very slow





Still with encephalopathy - pulled out IV again today


Now with pleural effusion and pneumonia


B12 deficiency


Appreciate hospitalists





I reviewed his chart in [facility] and also has COPD, heart failure and 

paroxysmal Afib.  Followed by Dr. Ron.





S: Continued confusion. Pulled IV out this am.   Wants to eat.  





O: Alert, sitting in chair.  Staff cleaning up blood from where he removed his 

IV


Afebrile


VSS


CTAB


Regular rate


Abdomen with distension but soft. Seems less distended than yesterday.  BS 

present  Incision cdi.  


Urostomy with appliance in place





01/18/19 09:15




















Plan:





01/19/19 09:51





01/19/19 10:02





01/19/19 10:05





01/20/19 11:09





Objective: 





 Vital Signs











Temp Pulse Resp BP Pulse Ox


 


 36.4 C   76   14   135/84 H  96 


 


 01/20/19 08:28  01/20/19 08:28  01/20/19 08:28  01/20/19 08:28  01/20/19 08:28








 Laboratory Results





 01/20/19 04:44 





 01/20/19 04:44 





 











 01/19/19 01/20/19 01/21/19





 05:59 05:59 05:59


 


Intake Total 2686 1313 1538


 


Output Total 1250 1350 


 


Balance 1436 -37 1538








 











PT  12.6 SEC (12.0-15.0)   01/15/19  02:00    


 


INR  0.92  (0.83-1.16)   01/15/19  02:00    














ICD10 Worksheet


Patient Problems: 


 Problems











Problem Status Onset


 


SBO (small bowel obstruction) Acute  


 


Abdominal pain Acute  


 


Bladder cancer Acute  


 


Edema Acute  


 


Gross hematuria Acute  


 


Hydronephrosis, left Acute  


 


MRSA (methicillin resistant Staphylococcus aureus) Acute  ~06/04/17


 


Sepsis Acute  


 


Shortness of breath Acute  


 


Syncope Acute  


 


Urinary tract infection Acute

## 2019-01-21 RX ADMIN — LISINOPRIL SCH MG: 10 TABLET ORAL at 14:53

## 2019-01-21 RX ADMIN — TAZOBACTAM SODIUM AND PIPERACILLIN SODIUM SCH MLS: 500; 4 INJECTION, SOLUTION INTRAVENOUS at 18:08

## 2019-01-21 RX ADMIN — ACETAMINOPHEN PRN MG: 500 TABLET ORAL at 15:59

## 2019-01-21 RX ADMIN — KETOROLAC TROMETHAMINE SCH: 15 INJECTION, SOLUTION INTRAMUSCULAR; INTRAVENOUS at 05:54

## 2019-01-21 RX ADMIN — TAZOBACTAM SODIUM AND PIPERACILLIN SODIUM SCH MLS: 500; 4 INJECTION, SOLUTION INTRAVENOUS at 11:28

## 2019-01-21 RX ADMIN — ENOXAPARIN SODIUM SCH: 100 INJECTION SUBCUTANEOUS at 09:00

## 2019-01-21 RX ADMIN — TAZOBACTAM SODIUM AND PIPERACILLIN SODIUM SCH MLS: 500; 4 INJECTION, SOLUTION INTRAVENOUS at 05:54

## 2019-01-21 RX ADMIN — DEXTROSE MONOHYDRATE, SODIUM CHLORIDE, AND POTASSIUM CHLORIDE SCH MLS: 50; 4.5; 1.49 INJECTION, SOLUTION INTRAVENOUS at 11:28

## 2019-01-21 NOTE — ASMTCMCOM
CM Note

 

CM Note                       

Notes:

CM spoke with [facility] and [facility] DENIED pt, asllscripts updated. [facility] pt. He 

currently has a sitter. CM to follow as pt progresses and gets more medically stable for D/C to 

SNF. 



Plan: SNF. Pt prefers in [place]. Pt has been denied numerous facilities. 

 

Date Signed:  01/21/2019 02:13 PM

Electronically Signed By:CHRISTIAN Taylor

## 2019-01-21 NOTE — SOAPPROG
SOAP Progress Note


Assessment/Plan: 


Assessment/Plan: 68 Y M s/p incarcerated parastomal hernia repair, POD#6. Hx 

COPD, CHF, paroxysmal afib.





NG in place, has been clamped for days per RN, but now patient refusing removal 

2/2 fear of it needing to be replaced. Still recommending removal. Then likely 

advance to regular diet. 


Encephalopathy. Appreciate hospitalist input and care. Labs ok. Limiting use of 

narcotics/benzo's/phenergan.


PNA, pleural effusion. On Zosyn. 





Seen and examined with Dr. Peña. Will need to return later today when he 

is more awake for better exam. 


Dispo: pending improved mental status. Patient was sleeping at the shelter 

prior to admission. 





S: +BM per sitter. Couldn't fall asleep until 7:45 this am per sitter. Now 

sleeping. Not cooperative with exam. Mutters some words. Per sitter and nurse, 

asked questions last night about where he was, what he was doing here, what did 

he do yesterday....





O: 


sleeping, arousable but refusing to awaken and won't move from fetal position.


Regular rate


Abdomen +BS


Urostomy with appliance in place, clear yellow urine





01/21/19 10:11





Objective: 





 Vital Signs











Temp Pulse Resp BP Pulse Ox


 


 36.7 C   58 L  16   145/92 H  93 


 


 01/21/19 04:36  01/21/19 08:50  01/21/19 08:50  01/21/19 08:50  01/21/19 08:50








 Laboratory Results





 01/20/19 04:44 





 01/20/19 04:44 





 











 01/20/19 01/21/19 01/22/19





 05:59 05:59 05:59


 


Intake Total 1313 1838 


 


Output Total 1350 1500 


 


Balance -37 338 








 











PT  12.6 SEC (12.0-15.0)   01/15/19  02:00    


 


INR  0.92  (0.83-1.16)   01/15/19  02:00    














ICD10 Worksheet


Patient Problems: 


 Problems











Problem Status Onset


 


SBO (small bowel obstruction) Acute  


 


Abdominal pain Acute  


 


Bladder cancer Acute  


 


Edema Acute  


 


Gross hematuria Acute  


 


Hydronephrosis, left Acute  


 


MRSA (methicillin resistant Staphylococcus aureus) Acute  ~06/04/17


 


Sepsis Acute  


 


Shortness of breath Acute  


 


Syncope Acute  


 


Urinary tract infection Acute

## 2019-01-21 NOTE — HOSPPROG
Hospitalist Progress Note


Assessment/Plan: 





69 yo M w postop encephalopathy





encephalopathy: acute


   toxic metabolic etiology


   limit ativan/phenergan


   improved





pneumonia: HCAP


   zosyn day 3/5


   


parastomal hernia: s/p repair





prop: [facility]





pain: prn narcotics





dispo: inpt


Subjective: NGT out.  more alert.  case d/w yolanda rutherford


Objective: 


 Vital Signs











Temp Pulse Resp BP Pulse Ox


 


 36.8 C   62   16   139/98 H  93 


 


 01/21/19 14:37  01/21/19 14:37  01/21/19 14:37  01/21/19 14:37  01/21/19 14:37








 Laboratory Results





 01/20/19 04:44 





 01/20/19 04:44 





 











 01/20/19 01/21/19 01/22/19





 05:59 05:59 05:59


 


Intake Total 1313 1838 


 


Output Total 1350 1500 1200


 


Balance -37 338 -1200








 











PT  12.6 SEC (12.0-15.0)   01/15/19  02:00    


 


INR  0.92  (0.83-1.16)   01/15/19  02:00    














- Physical Exam


Constitutional: no apparent distress, appears nourished


Eyes: PERRL, anicteric sclera


Ears, Nose, Mouth, Throat: moist mucous membranes, hearing normal


Cardiovascular: regular rate and rhythym, no murmur, rub, or gallop


Respiratory: no respiratory distress, other (crackles L base)


Gastrointestinal: soft, non-tender abdomen


Genitourinary: no bladder fullness, No christie in urethra


Skin: warm


Musculoskeletal: full muscle strength


Neurologic: AAOx3





ICD10 Worksheet


Patient Problems: 


 Problems











Problem Status Onset


 


SBO (small bowel obstruction) Acute  


 


Abdominal pain Acute  


 


Bladder cancer Acute  


 


Edema Acute  


 


Gross hematuria Acute  


 


Hydronephrosis, left Acute  


 


MRSA (methicillin resistant Staphylococcus aureus) Acute  ~06/04/17


 


Sepsis Acute  


 


Shortness of breath Acute  


 


Syncope Acute  


 


Urinary tract infection Acute

## 2019-01-22 VITALS — DIASTOLIC BLOOD PRESSURE: 81 MMHG | SYSTOLIC BLOOD PRESSURE: 129 MMHG

## 2019-01-22 RX ADMIN — ENOXAPARIN SODIUM SCH: 100 INJECTION SUBCUTANEOUS at 10:48

## 2019-01-22 RX ADMIN — TAZOBACTAM SODIUM AND PIPERACILLIN SODIUM SCH MLS: 500; 4 INJECTION, SOLUTION INTRAVENOUS at 00:38

## 2019-01-22 RX ADMIN — TAZOBACTAM SODIUM AND PIPERACILLIN SODIUM SCH MLS: 500; 4 INJECTION, SOLUTION INTRAVENOUS at 12:13

## 2019-01-22 RX ADMIN — LISINOPRIL SCH MG: 10 TABLET ORAL at 12:13

## 2019-01-22 RX ADMIN — ACETAMINOPHEN PRN MG: 500 TABLET ORAL at 06:34

## 2019-01-22 RX ADMIN — TAZOBACTAM SODIUM AND PIPERACILLIN SODIUM SCH MLS: 500; 4 INJECTION, SOLUTION INTRAVENOUS at 04:29

## 2019-01-22 NOTE — ASMTDCNOTE
Case Management Discharge

 

Discharge Order Complete?     Answers:  Yes                                   

Patient to Obtain             Answers:  Other                         Notes:  [facility] arranged in 

[facility]

Transportation Arranged       Answers:  Other                         Notes:  [facility] arranged

Faxed Final Orders            Answers:  Yes                                   

Agency/Facility Transfer      Answers:  Yes                                   

Report Printed & Faxed to                                                     

Receiving Agency                                                              

Discharge Comments            

Notes:

Pt refused to go to [place] facility and wanted to go to a facility in [place]. [facility] 

accepted pt and are aware that he will need long term care. Seda from [facility] is aware and said 

that she has a SW that will work with him to place him in care once he completes his therapies. CM 

submit updated therapy notes and discharge ppwk to [facility]. CM Notified pt's workers at [facility] and [facility]. Transport scheduled by [facility] for 4pm. 



No other CM needs identified at this time. 

 

Date Signed:  01/22/2019 02:43 PM

Electronically Signed By:CHRISTIAN Taylor

## 2019-01-22 NOTE — ASDISCHSUM
----------------------------------------------

Discharge Information

----------------------------------------------

Plan Status:SNF                                      Medically Cleared to Leave:

Discharge Date:                                       D/C Disposition:Skilled Nursing Facility

[facility] D/C Disposition:Skilled Nursing Facility         Projected Discharge Date:01/22/2019 11:00 AM

Transportation at D/C:Wheelchair Van                 Discharge Delay Reason:

Follow-Up Date:01/22/2019 11:00 AM                   Discharge Slot:

Final Diagnosis:

----------------------------------------------

Placement Information

----------------------------------------------

Referral Type:*Nursing Home/SNF                      Referral ID:[id number]

Provider Name:Leisa cates [place]

Address 1:[address]                  Phone Number:

Address 2:                                           Fax Number:

[facility]:[place]                                        Selection Factors:

State:[place]

 

----------------------------------------------

Patient Contact Information

----------------------------------------------

Contact Name:SPEEDY                                Relationship:

Address:[address] [phone]

                                                     Work Phone:

[facility]:[place]                                        Alternate Phone:

[facility]/Zip Code:[place] [address]                              Email:

----------------------------------------------

Financial Information

----------------------------------------------

Financial Class:Medicare

Primary Plan Desc:MEDICARE INPATIENT                 Primary Plan Number:[id number]

Secondary Plan Desc[place]      Secondary Plan Number:[id number]

 

 

----------------------------------------------

Assessment Information

----------------------------------------------

----------------------------------------------

LACE

----------------------------------------------

LACE

 

Length of stay for            Answers:  7-13 days                             

current admission                                                             

Acuity / Level of             Answers:  Yes                                   

Care: Did the patient                                                         

have an inpatient                                                             

admission?                                                                    

Comorbidities - select        Answers:  Any tumor (including                  

all that apply                          lymphoma or leukemia)                 

                                        Chronic pulmonary disease             

                                        Moderate or severe liver              

                                        or renal disease                      

                                        Other                         Notes:  HTN

# of Emergency department     Answers:  3-4                                   

visits in the last 6                                                          

months                                                                        

Social determinants           Answers:  History of substance                  

                                        abuse (ETOH, street                   

                                        drugs, prescription                   

                                        drugs, etc.)                          

                                        Homelessness                          

                                        (street, shelter)                     

                                        Mental health diagnosis               

                                        (anxiety, depression, pers            

                                        onality disorders, etc.)              

                                        Lack of community                     

                                        resources and/or lack of              

                                        social support (no                    

                                        pcp, lives                            

                                        alone, transportation, brian            

                                        d)                                    

Score: 33

 

Date Signed:  01/22/2019 02:44 PM

Electronically Signed By:CHRISTIAN Taylor

 

 

----------------------------------------------

[facility] CM Progress Note

----------------------------------------------

CM Note

 

CM Note                       

Notes:

Luke, 69 yo M presents with small bowel obstruction and under went surgery. Pt has a urostomy 

bag, has history of bladder cancer. Has frequent visits in ED/hospital. 

CM reviewed chart and conferred with RN. Pt came from shelter. He reports his son is in a 

"healtcare facility" but reports family members say that he is in residential. Cm tried to contact him 

from number in chart and his phone is disconnected. (Renan [phone]). 



Pt reports his Adult Care Manager, Flakita Villagran is very helpful. CM reviewed charts and found 

numbers for [place] [facility] ([facility] counselor [phone]), CM left message requesting callback. CM 

also left message for gideon Salcido's donte RN ([phone]) requesting call back. 



Pt reports he wants to go to a SNF in [place]. According to records, pt has been denied to a 

number of places in [place] and was last discharged to [facility] in [place] in May. 



CM will collaborate with treatment team to develop discharge plan and continue to work with 

collaterals. 

CM to follow. 



Plan: SNF

 

Date Signed:  01/15/2019 02:58 PM

Electronically Signed By:CHRISTIAN Taylor

 

 

----------------------------------------------

[facility] ROLY Progress Note

----------------------------------------------

CM Note

 

CM Note                       

Notes:

PT/OT recommending SNF. CM completed PASSAR and submit referrals to various SNFs. Pt signed consent 


for CM to speak with counselor at [facility]. CM awaiting call back. 



Plan: SNF

 

Date Signed:  01/17/2019 09:21 AM

Electronically Signed By:CHRISTIAN Taylor

 

 

----------------------------------------------

[facility] Progress Note

----------------------------------------------

CM Note

 

CM Note                       

Notes:

Pt has been accepted to [facility] for short term rehab and has agreed to go, however, pt has 

refused therapies and they have not been able to make SNF recommendations. LT Medicaid application 


and [id number] form also initiated. [employer] emailed to begin application. [facility] has 48 business hours 

to evaluate pt for LTC, however pt could go to [facility] before this occurs under medicare, pending 


PT/OT evaluations. CM communicated this to pt and encouraged participation in therapies. [facility] 

updated with PASSR and [id number] via fax and phone.CM to follow. 



D/C Plan: [facility] SNF to LTC 

 

Date Signed:  01/18/2019 04:59 PM

Electronically Signed By:Mayra Warner

 

 

----------------------------------------------

[facility] Progress Note

----------------------------------------------

CM Note

 

CM Note                       

Notes:

CM spoke with [facility] and they DENIED pt, asllscripts updated. [facility] accepted pt. He 

currently has a sitter. CM to follow as pt progresses and gets more medically stable for D/C to 

SNF. 



Plan: SNF. Pt prefers in [place]. Pt has been denied numerous facilities. 

 

Date Signed:  01/21/2019 02:13 PM

Electronically Signed By:CHRISTIAN Taylor

 

 

----------------------------------------------

Case Management Discharge Plan Note

----------------------------------------------

Case Management Discharge

 

Discharge Order Complete?     Answers:  Yes                                   

Patient to Obtain             Answers:  Other                         Notes:  [facility] arranged in 

[facility]

Transportation Arranged       Answers:  Other                         Notes:  [facility] arranged

Faxed Final Orders            Answers:  Yes                                   

Agency/Facility Transfer      Answers:  Yes                                   

Report Printed & Faxed to                                                     

Receiving Agency                                                              

Discharge Comments            

Notes:

Pt refused to go to [facility] and wanted to go to a facility in [place]. [facility] SNF 

accepted pt and are aware that he will need long term care. Seda from [facility] is aware and said 

that she has a SW that will work with him to place him in care once he completes his therapies. CM 

submit updated therapy notes and discharge ppwk to [facility]. CM Notified pt's workers at [facility] and [facility]. Transport scheduled by [facility] for 4pm. 



No other CM needs identified at this time. 

 

Date Signed:  01/22/2019 02:43 PM

Electronically Signed By:CHRISTIAN Taylor

 

 

----------------------------------------------

Intervention Information

----------------------------------------------

Intervention Type:*IM-Signed                         Date of Service:01/22/2019 12:12 PM

Patient Type:Inpatient                               Staff Member:Orin Sam

Hours:                                               Discipline:

Severity:                                            Comment:

## 2019-01-22 NOTE — ASMTLACE
LACE

 

Length of stay for            Answers:  7-13 days                             

current admission                                                             

Acuity / Level of             Answers:  Yes                                   

Care: Did the patient                                                         

have an inpatient                                                             

admission?                                                                    

Comorbidities - select        Answers:  Any tumor (including                  

all that apply                          lymphoma or leukemia)                 

                                        Chronic pulmonary disease             

                                        Moderate or severe liver              

                                        or renal disease                      

                                        Other                         Notes:  HTN

# of Emergency department     Answers:  3-4                                   

visits in the last 6                                                          

months                                                                        

Social determinants           Answers:  History of substance                  

                                        abuse (ETOH, street                   

                                        drugs, prescription                   

                                        drugs, etc.)                          

                                        Homelessness                          

                                        (street, shelter)                     

                                        Mental health diagnosis               

                                        (anxiety, depression, pers            

                                        onality disorders, etc.)              

                                        Lack of community                     

                                        resources and/or lack of              

                                        social support (no                    

                                        pcp, lives                            

                                        alone, transportation, brian            

                                        d)                                    

Score: 33

 

Date Signed:  01/22/2019 02:44 PM

Electronically Signed By:CHRISTIAN Taylor

## 2019-01-22 NOTE — PDIAF
- Diagnosis


Diagnosis: s/p parastomal hernia repair


Code Status: Full Code





- Medication Management


Long Term Antibiotics: Levaquin 750 mg PO daily x 3 days


Discharge Medications: electronically signed and located in the Home Medication 

List.


PICC Care - Routine: N/A





- Orders


Services needed: Registered Nurse, Physical Therapy, Occupational Therapy


Isolation Type: None


Diet Recommendation: no restrictions on diet


Diet Texture: Regular Texture Diet


Wound Care Instructions: No lifting >15 lbs. Routine urostomy care. Ok for 

incision to get wet in shower. No baths/pools.


Activity/Weight Bearing Restrictions: No lifting >15 lbs. Routine urostomy 

care. Ok for incision to get wet in shower. No baths/pools.


Additional Instructions: 


No lifting >15 lbs. Routine urostomy care. Ok for incision to get wet in 

shower. No baths/pools. 





- Follow Up Care


Current Providers and Referrals: 


JUAN M ACUNA [Primary Care Provider] - As per Instructions


Dilip Kaufman MD [Medical Doctor] -  (1-2 weeks)

## 2019-01-22 NOTE — SOAPPROG
SOAP Progress Note


Assessment/Plan: 


Assessment/Plan: 68 Y M s/p incarcerated parastomal hernia repair, POD#6. Hx 

COPD, CHF, paroxysmal afib.





NG out yesterday. 


regular diet today. 


Encephalopathy. Appreciate hospitalist input and care. Improved. 


PNA, pleural effusion. On Zosyn. Treatment needed on d/c? 





Seen and examined with Dr. Peña. 


Dispo: per , approved at [facility]. D/c today or tomorrow pending medical 

clearance. 





S: hungry. no n/v/. passing gas.





O: 


more alert, cooperative. making some jokes. 


ctab anteriorly


Regular rate


Abdomen +BS


Urostomy with appliance in place, clear yellow urine, inc under appliance not 

directly viewed








01/22/19 09:19





Objective: 





 Vital Signs











Temp Pulse Resp BP Pulse Ox


 


 36.7 C   66   16   125/79 H  94 


 


 01/22/19 08:00  01/22/19 08:00  01/22/19 08:00  01/22/19 08:00  01/22/19 08:00








 Laboratory Results





 01/20/19 04:44 





 01/20/19 04:44 





 











 01/21/19 01/22/19 01/23/19





 05:59 05:59 05:59


 


Intake Total 1838 4142 


 


Output Total 1500 3620 


 


Balance 338 522 








 











PT  12.6 SEC (12.0-15.0)   01/15/19  02:00    


 


INR  0.92  (0.83-1.16)   01/15/19  02:00    














ICD10 Worksheet


Patient Problems: 


 Problems











Problem Status Onset


 


SBO (small bowel obstruction) Acute  


 


Abdominal pain Acute  


 


Bladder cancer Acute  


 


Edema Acute  


 


Gross hematuria Acute  


 


Hydronephrosis, left Acute  


 


MRSA (methicillin resistant Staphylococcus aureus) Acute  ~06/04/17


 


Sepsis Acute  


 


Shortness of breath Acute  


 


Syncope Acute  


 


Urinary tract infection Acute